# Patient Record
Sex: MALE | Race: WHITE | NOT HISPANIC OR LATINO | Employment: STUDENT | URBAN - METROPOLITAN AREA
[De-identification: names, ages, dates, MRNs, and addresses within clinical notes are randomized per-mention and may not be internally consistent; named-entity substitution may affect disease eponyms.]

---

## 2023-04-10 ENCOUNTER — OFFICE VISIT (OUTPATIENT)
Dept: PEDIATRICS | Facility: CLINIC | Age: 23
End: 2023-04-10
Payer: COMMERCIAL

## 2023-04-10 VITALS — TEMPERATURE: 98.1 F | BODY MASS INDEX: 22.88 KG/M2 | HEART RATE: 88 BPM | WEIGHT: 157.2 LBS | OXYGEN SATURATION: 98 %

## 2023-04-10 VITALS
HEART RATE: 69 BPM | SYSTOLIC BLOOD PRESSURE: 118 MMHG | HEIGHT: 70 IN | WEIGHT: 138.25 LBS | BODY MASS INDEX: 19.79 KG/M2 | DIASTOLIC BLOOD PRESSURE: 72 MMHG

## 2023-04-10 DIAGNOSIS — R10.9 FLANK PAIN, ACUTE: ICD-10-CM

## 2023-04-10 DIAGNOSIS — R31.0 GROSS HEMATURIA: Primary | ICD-10-CM

## 2023-04-10 PROBLEM — F41.9 ANXIETY: Status: ACTIVE | Noted: 2023-04-10

## 2023-04-10 PROBLEM — B27.90 INFECTIOUS MONONUCLEOSIS: Status: ACTIVE | Noted: 2019-02-25

## 2023-04-10 PROBLEM — F12.10 CANNABIS ABUSE: Status: ACTIVE | Noted: 2023-04-10

## 2023-04-10 PROBLEM — H52.10 MYOPIA: Status: ACTIVE | Noted: 2023-04-10

## 2023-04-10 PROBLEM — H52.11 MYOPIA OF RIGHT EYE: Status: ACTIVE | Noted: 2019-08-14

## 2023-04-10 PROBLEM — H57.02 ANISOCORIA: Status: ACTIVE | Noted: 2023-04-10

## 2023-04-10 PROBLEM — H47.329 OPTIC NERVE DRUSEN: Status: ACTIVE | Noted: 2023-04-10

## 2023-04-10 PROBLEM — F90.2 ADHD (ATTENTION DEFICIT HYPERACTIVITY DISORDER), COMBINED TYPE: Status: ACTIVE | Noted: 2023-04-10

## 2023-04-10 PROBLEM — H47.099 DISORDER OF OPTIC NERVE: Status: ACTIVE | Noted: 2018-06-14

## 2023-04-10 PROBLEM — B95.8 STAPH SKIN INFECTION: Status: ACTIVE | Noted: 2023-04-10

## 2023-04-10 PROBLEM — F15.20: Status: ACTIVE | Noted: 2023-04-10

## 2023-04-10 PROBLEM — F12.20 CANNABIS USE DISORDER, SEVERE, DEPENDENCE (MULTI): Status: ACTIVE | Noted: 2023-04-10

## 2023-04-10 PROBLEM — R41.840 ATTENTION AND CONCENTRATION DEFICIT: Status: ACTIVE | Noted: 2020-06-26

## 2023-04-10 PROBLEM — L08.9 STAPH SKIN INFECTION: Status: ACTIVE | Noted: 2023-04-10

## 2023-04-10 PROBLEM — H53.8 BLURRED VISION: Status: ACTIVE | Noted: 2023-04-10

## 2023-04-10 PROBLEM — F10.20 ALCOHOL USE DISORDER, MODERATE, DEPENDENCE (MULTI): Status: ACTIVE | Noted: 2023-04-10

## 2023-04-10 PROBLEM — G43.109 CLASSIC MIGRAINE WITH AURA: Status: ACTIVE | Noted: 2023-04-10

## 2023-04-10 LAB
POC APPEARANCE, URINE: ABNORMAL
POC BILIRUBIN, URINE: NEGATIVE
POC BLOOD, URINE: ABNORMAL
POC COLOR, URINE: YELLOW
POC GLUCOSE, URINE: NEGATIVE MG/DL
POC KETONES, URINE: NEGATIVE MG/DL
POC LEUKOCYTES, URINE: ABNORMAL
POC NITRITE,URINE: NEGATIVE
POC PH, URINE: 5 PH
POC PROTEIN, URINE: ABNORMAL MG/DL
POC SPECIFIC GRAVITY, URINE: >=1.03
POC UROBILINOGEN, URINE: 0.2 EU/DL

## 2023-04-10 PROCEDURE — 81002 URINALYSIS NONAUTO W/O SCOPE: CPT | Performed by: PEDIATRICS

## 2023-04-10 PROCEDURE — 99215 OFFICE O/P EST HI 40 MIN: CPT | Performed by: PEDIATRICS

## 2023-04-10 RX ORDER — ESCITALOPRAM OXALATE 10 MG/1
TABLET ORAL
COMMUNITY
End: 2023-10-06 | Stop reason: ALTCHOICE

## 2023-04-10 RX ORDER — ESCITALOPRAM OXALATE 20 MG/1
1 TABLET ORAL DAILY
COMMUNITY
Start: 2022-09-14 | End: 2023-10-06 | Stop reason: ALTCHOICE

## 2023-04-10 RX ORDER — DEXTROAMPHETAMINE SACCHARATE, AMPHETAMINE ASPARTATE MONOHYDRATE, DEXTROAMPHETAMINE SULFATE AND AMPHETAMINE SULFATE 5; 5; 5; 5 MG/1; MG/1; MG/1; MG/1
1 CAPSULE, EXTENDED RELEASE ORAL DAILY
COMMUNITY
End: 2023-10-06 | Stop reason: ALTCHOICE

## 2023-04-10 RX ORDER — FLUOXETINE 20 MG/1
TABLET ORAL
COMMUNITY
Start: 2023-01-19 | End: 2023-10-06 | Stop reason: SINTOL

## 2023-04-10 RX ORDER — FLUOXETINE HYDROCHLORIDE 20 MG/1
1 CAPSULE ORAL DAILY
COMMUNITY
Start: 2023-01-26 | End: 2023-10-11 | Stop reason: ALTCHOICE

## 2023-04-10 RX ORDER — HYDROXYZINE HYDROCHLORIDE 25 MG/1
TABLET, FILM COATED ORAL
COMMUNITY
Start: 2023-02-07 | End: 2023-10-06 | Stop reason: SINTOL

## 2023-04-10 RX ORDER — FLUOXETINE HYDROCHLORIDE 40 MG/1
CAPSULE ORAL
COMMUNITY
Start: 2023-04-07 | End: 2023-10-11 | Stop reason: ALTCHOICE

## 2023-04-10 RX ORDER — DEXTROAMPHETAMINE SACCHARATE, AMPHETAMINE ASPARTATE MONOHYDRATE, DEXTROAMPHETAMINE SULFATE AND AMPHETAMINE SULFATE 7.5; 7.5; 7.5; 7.5 MG/1; MG/1; MG/1; MG/1
CAPSULE, EXTENDED RELEASE ORAL
COMMUNITY
End: 2023-10-06 | Stop reason: ALTCHOICE

## 2023-04-10 RX ORDER — MUPIROCIN 20 MG/G
OINTMENT TOPICAL 3 TIMES DAILY
COMMUNITY
Start: 2018-06-29 | End: 2023-10-06 | Stop reason: SINTOL

## 2023-04-10 RX ORDER — DEXTROAMPHETAMINE SACCHARATE, AMPHETAMINE ASPARTATE MONOHYDRATE, DEXTROAMPHETAMINE SULFATE AND AMPHETAMINE SULFATE 6.25; 6.25; 6.25; 6.25 MG/1; MG/1; MG/1; MG/1
CAPSULE, EXTENDED RELEASE ORAL
COMMUNITY
End: 2023-10-06 | Stop reason: ALTCHOICE

## 2023-04-10 NOTE — PROGRESS NOTES
Subjective   History was provided by the mother and patient.  Hima Mesa is a 22 y.o. male who presents for evaluation of URI  Symptoms include nasal blockage and productive cough, somewhat improved in alst few days but also R flank/frontal pain x this AM, signif per pt (7/10 now; just vomited in room)  - pee looked NL this AM but darker just now here  - no hx kidney stones in pt - MGM w/ hx stones   Tmax afebrile  Symptoms DO NOT include:  headache, sinus and nasal congestion, and sore throat   Onset of symptoms was 7 days ago  Associated abdominal symptoms:  vomiting no diar  He is drinking plenty of fluids.   Energy level NL:  No  Treatment to date: none    Exposure to COVID No  Exposure to AGE sx No    Objective   Pulse 88   Temp 36.7 °C (98.1 °F)   Wt 71.3 kg (157 lb 3.2 oz)   SpO2 98%   BMI 22.88 kg/m²   General: alert, active, in no acute distress  Eyes:  scleral injection No  Throat: moist mucous membranes without erythema, exudates or petechiae  Neck: supple, no lymphadenopathy  Lungs: good aeration throughout all lung fields, no retractions, no nasal flaring, and clear breath sounds bilaterally  Heart: regular rate and rhythm, normal S1 and S2, no murmur  Abd:  soft or tender to palpation mostly on R and R flank, also suprapubic    Assessment/Plan   22 y.o. male w/ viral upper respiratory illness and likely renal stone  Suggested symptomatic OTC remedies. :  gave 600mg ibupr and 8mg ondans ODT now - mom just called (30m later) and requested opiate pain control.  Since pt needs labs and likely imaging should head to [facility].   Spoke w/ ED attg there.

## 2023-04-24 ENCOUNTER — TELEPHONE (OUTPATIENT)
Dept: PEDIATRICS | Facility: CLINIC | Age: 23
End: 2023-04-24
Payer: COMMERCIAL

## 2023-04-24 NOTE — TELEPHONE ENCOUNTER
- spoke w/ mom re:  ?cont substance use - wanted to know how to proceed - pointed her to local centers, can call intake and hopefully he might agree w/ the need to go   - wanted BF and states will also call her for advice when she's back

## 2023-06-01 ENCOUNTER — TELEPHONE (OUTPATIENT)
Dept: PEDIATRICS | Facility: CLINIC | Age: 23
End: 2023-06-01
Payer: COMMERCIAL

## 2023-06-01 PROBLEM — N20.0 KIDNEY STONE ON RIGHT SIDE: Status: ACTIVE | Noted: 2023-06-01

## 2023-06-01 NOTE — TELEPHONE ENCOUNTER
Mom called to give me an update and seek a referral. Issues over the past year. Has been seeing [facility].  1/2022  IOP - left after 3 d. [facility] - diversion program.  Completed program but no changes made.  Working through [employer]. No current job.      Also discussed kidney stone.

## 2023-08-29 PROBLEM — N21.1 CALCULUS IN URETHRA: Status: ACTIVE | Noted: 2023-08-29

## 2023-08-29 RX ORDER — NAPROXEN 500 MG/1
1 TABLET ORAL 2 TIMES DAILY
COMMUNITY
Start: 2023-04-10 | End: 2023-10-06 | Stop reason: SINTOL

## 2023-09-13 ENCOUNTER — APPOINTMENT (OUTPATIENT)
Dept: PEDIATRICS | Facility: CLINIC | Age: 23
End: 2023-09-13
Payer: COMMERCIAL

## 2023-10-06 ENCOUNTER — TELEMEDICINE (OUTPATIENT)
Dept: BEHAVIORAL HEALTH | Facility: CLINIC | Age: 23
End: 2023-10-06
Payer: COMMERCIAL

## 2023-10-06 DIAGNOSIS — F41.9 ANXIETY: ICD-10-CM

## 2023-10-06 DIAGNOSIS — F10.20 ALCOHOL USE DISORDER, MODERATE, DEPENDENCE (MULTI): ICD-10-CM

## 2023-10-06 DIAGNOSIS — F90.2 ADHD (ATTENTION DEFICIT HYPERACTIVITY DISORDER), COMBINED TYPE: ICD-10-CM

## 2023-10-06 DIAGNOSIS — F15.20: ICD-10-CM

## 2023-10-06 DIAGNOSIS — F12.20 CANNABIS USE DISORDER, SEVERE, DEPENDENCE (MULTI): ICD-10-CM

## 2023-10-06 PROCEDURE — 99214 OFFICE O/P EST MOD 30 MIN: CPT | Performed by: PSYCHIATRY & NEUROLOGY

## 2023-10-06 RX ORDER — MIRTAZAPINE 15 MG/1
15 TABLET, FILM COATED ORAL NIGHTLY
Qty: 30 TABLET | Refills: 2 | Status: SHIPPED | OUTPATIENT
Start: 2023-10-06 | End: 2023-11-24 | Stop reason: SDUPTHER

## 2023-10-06 ASSESSMENT — ENCOUNTER SYMPTOMS
CONFUSION: 0
AGITATION: 0
HALLUCINATIONS: 0

## 2023-10-10 RX ORDER — MELATONIN 5 MG
5 CAPSULE ORAL
COMMUNITY
End: 2023-10-11 | Stop reason: ALTCHOICE

## 2023-10-10 RX ORDER — CEPHALEXIN 500 MG/1
500 CAPSULE ORAL 3 TIMES DAILY
COMMUNITY
Start: 2023-07-24 | End: 2023-10-11 | Stop reason: ALTCHOICE

## 2023-10-10 RX ORDER — TAMSULOSIN HYDROCHLORIDE 0.4 MG/1
CAPSULE ORAL
COMMUNITY
End: 2023-10-11 | Stop reason: ALTCHOICE

## 2023-10-10 RX ORDER — NALTREXONE HYDROCHLORIDE 50 MG/1
TABLET, FILM COATED ORAL
COMMUNITY
Start: 2023-07-07 | End: 2023-10-11 | Stop reason: ALTCHOICE

## 2023-10-10 RX ORDER — MIRTAZAPINE 15 MG/1
15 TABLET, FILM COATED ORAL
COMMUNITY
End: 2023-10-11 | Stop reason: SDUPTHER

## 2023-10-10 RX ORDER — NALOXONE HYDROCHLORIDE 4 MG/.1ML
4 SPRAY NASAL
COMMUNITY
End: 2023-10-11 | Stop reason: ALTCHOICE

## 2023-10-10 RX ORDER — ATOMOXETINE 25 MG/1
CAPSULE ORAL
COMMUNITY
End: 2023-10-11 | Stop reason: ALTCHOICE

## 2023-10-11 ENCOUNTER — OFFICE VISIT (OUTPATIENT)
Dept: PEDIATRICS | Facility: CLINIC | Age: 23
End: 2023-10-11
Payer: COMMERCIAL

## 2023-10-11 VITALS
BODY MASS INDEX: 25.41 KG/M2 | WEIGHT: 177.5 LBS | SYSTOLIC BLOOD PRESSURE: 133 MMHG | HEART RATE: 90 BPM | HEIGHT: 70 IN | DIASTOLIC BLOOD PRESSURE: 89 MMHG

## 2023-10-11 DIAGNOSIS — Z23 FLU VACCINE NEED: ICD-10-CM

## 2023-10-11 DIAGNOSIS — Z13.9 SCREENING PROCEDURE: ICD-10-CM

## 2023-10-11 DIAGNOSIS — Z00.00 WELLNESS EXAMINATION: Primary | ICD-10-CM

## 2023-10-11 DIAGNOSIS — Z23 NEED FOR VACCINATION: ICD-10-CM

## 2023-10-11 DIAGNOSIS — Z13.220 LIPID SCREENING: ICD-10-CM

## 2023-10-11 PROBLEM — B27.90 INFECTIOUS MONONUCLEOSIS: Status: RESOLVED | Noted: 2019-02-25 | Resolved: 2023-10-11

## 2023-10-11 PROBLEM — G43.109 CLASSIC MIGRAINE WITH AURA: Status: RESOLVED | Noted: 2023-04-10 | Resolved: 2023-10-11

## 2023-10-11 PROBLEM — L08.9 STAPH SKIN INFECTION: Status: RESOLVED | Noted: 2023-04-10 | Resolved: 2023-10-11

## 2023-10-11 PROBLEM — B95.8 STAPH SKIN INFECTION: Status: RESOLVED | Noted: 2023-04-10 | Resolved: 2023-10-11

## 2023-10-11 PROBLEM — R41.840 ATTENTION AND CONCENTRATION DEFICIT: Status: RESOLVED | Noted: 2020-06-26 | Resolved: 2023-10-11

## 2023-10-11 PROCEDURE — 1036F TOBACCO NON-USER: CPT | Performed by: PEDIATRICS

## 2023-10-11 PROCEDURE — 90686 IIV4 VACC NO PRSV 0.5 ML IM: CPT | Performed by: PEDIATRICS

## 2023-10-11 PROCEDURE — 90715 TDAP VACCINE 7 YRS/> IM: CPT | Performed by: PEDIATRICS

## 2023-10-11 PROCEDURE — 99395 PREV VISIT EST AGE 18-39: CPT | Performed by: PEDIATRICS

## 2023-10-11 PROCEDURE — 87800 DETECT AGNT MULT DNA DIREC: CPT

## 2023-10-11 PROCEDURE — 3008F BODY MASS INDEX DOCD: CPT | Performed by: PEDIATRICS

## 2023-10-11 PROCEDURE — 99214 OFFICE O/P EST MOD 30 MIN: CPT | Performed by: PEDIATRICS

## 2023-10-11 PROCEDURE — 90472 IMMUNIZATION ADMIN EACH ADD: CPT | Performed by: PEDIATRICS

## 2023-10-11 PROCEDURE — 96127 BRIEF EMOTIONAL/BEHAV ASSMT: CPT | Performed by: PEDIATRICS

## 2023-10-11 PROCEDURE — 90471 IMMUNIZATION ADMIN: CPT | Performed by: PEDIATRICS

## 2023-10-11 NOTE — PROGRESS NOTES
Review of current and recent hx:   Living in [facility] in [facility] - since August.  IOP but also have houses so you can stay there as long as you want. Finished IOP.  In third week of aftercare. 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  Plans to stay there until he gets off probation 1/2025.  After that wants to go back to school and get degree.  He needs to pay rent and provide food where he is so is looking for a job.  He gets private and group therapy multiple days per week.   Was in [facility] in [place] for a month - in patient facility.  Court mandated due to DUI (alcohol). [facility] was nice compared to [facility] (was there for 2 weeks - [facility]).    Spring 2022 last semester he was at [employer] [place]. Covid restricted his [place] and was using weed, alcohol, adderall, cocaine.  Came back to town.  At one point got kidney stones and got percocet and oxycodone which was cut at some point with xanax.      [facility] - remiron, meetings helped.  Weed was the most difficult to get off.      Frustrated and angry that he was put in [facility] after 2nd dui.    110 days sober - feels stuck.     A/P in structured environment for drug dependence.  Doing well with program.  We acknowledged the difficulties of the situation but the need for staying with it.  Follow up in 6 months.

## 2023-10-11 NOTE — PROGRESS NOTES
"Subjective   History was provided by Will ( I have been in contact with mom over the past few months).  Hima Mesa is a 22 y.o. male who is here for this well visit.      Current Issues:  Current concerns: see HPI  Vision or hearing concerns? no  Dental care up to date? Yes- brushes teeth 2 times/day , regular dental visits , does floss teeth   No significant recent physical health issues - hasn't had a kidney stone for a while  Specialist visits - Psychiatry    Review of Nutrition, Elimination, and Sleep:  Current diet:  3 meals/day , well balanced diet , normal portions , <8oz. sugar containing beverages daily , appropriate dairy intake , diet includes fruits and vegetables and protein.  Elimination: normal bowel movement frequency, normal consistency   Sleep: has structured bedtime routine , sleeps through the night , no trouble getting up    School and Social Screening:  School: not currently  Work: not currently  Activities: working out  Supportive social network. Current relationship - none.    Sports Participation Screening:  Gets regular exercise.    Screening Questions:  Other: normal mood, satisfied with body weight  Risk factors for dyslipidemia: no  Risk factors for sexually-transmitted infections:   Sexually active: yes - but not currently    Using condoms: Yes  Substance use:  Smoking - No  Vaping - No  Drinking - No  Drugs - No  Genitourinary:  +testicular exams    Objective   /89   Pulse 90   Ht 1.765 m (5' 9.5\")   Wt 80.5 kg (177 lb 8 oz)   BMI 25.84 kg/m²   Growth parameters are noted and are appropriate for age.    Physical Exam  Vitals reviewed.   Constitutional:       Appearance: Normal appearance.   HENT:      Head: Normocephalic.      Right Ear: Tympanic membrane and ear canal normal.      Left Ear: Tympanic membrane and ear canal normal.      Nose: Nose normal.      Mouth/Throat:      Mouth: Mucous membranes are moist.   Eyes:      Extraocular Movements: Extraocular movements " intact.      Conjunctiva/sclera: Conjunctivae normal.   Cardiovascular:      Rate and Rhythm: Normal rate and regular rhythm.      Heart sounds: Normal heart sounds.   Pulmonary:      Effort: Pulmonary effort is normal.      Breath sounds: Normal breath sounds.   Abdominal:      General: Abdomen is flat.      Palpations: Abdomen is soft. There is no mass.   Genitourinary:     Penis: Normal.       Testes: Normal.   Musculoskeletal:         General: Normal range of motion.      Cervical back: Normal and normal range of motion.      Thoracic back: Normal. No scoliosis.      Lumbar back: Normal. No scoliosis.      Right hip: Normal.      Left hip: Normal.      Right knee: Normal.      Left knee: Normal.      Right ankle: Normal.      Left ankle: Normal.      Right foot: Normal.      Left foot: Normal.   Skin:     General: Skin is warm.      Findings: No acne or rash.   Neurological:      General: No focal deficit present.      Mental Status: He is alert and oriented to person, place, and time.   Psychiatric:         Mood and Affect: Mood normal.         Behavior: Behavior normal.         Assessment/Plan   Hima was seen today for well child.  Diagnoses and all orders for this visit:  Wellness examination (Primary)  Lipid screening  -     Lipid Panel; Future  Screening procedure  -     C. Trachomatis / N. Gonorrhoeae, Amplified Detection  Need for vaccination  -     Tdap vaccine, age 7 years and older  Flu vaccine need  -     Flu vaccine (IIV4) age 6 months and greater, preservative free  Other orders  -     1 Year Follow Up In Pediatrics; Future  -     6 Month Follow Up In Pediatrics; Future    Well young adult.  - Anticipatory guidance discussed.   - Injury prevention: wearing seatbelt , understanding sun protection , understanding conflict resolution/violence prevention,  reviewed driving safety    -Risk Taking: cardiac risk factors reviewed , alcohol, drug and tobacco use reviewed , reviewed internet safety      -  Growth and weight gain appropriate. The patient was counseled regarding nutrition and physical activity.  - Development: appropriate for age  - Immunizations today: per orders. All vaccines given at today’s visit were reviewed with the family. Risks/benefits/side effects discussed and VIS sheet provided. All questions answered. Given with consent   - Follow up in 1 year for next well child exam or sooner with concerns.

## 2023-10-12 LAB
C TRACH RRNA SPEC QL NAA+PROBE: NEGATIVE
N GONORRHOEA DNA SPEC QL PROBE+SIG AMP: NEGATIVE

## 2023-11-24 ENCOUNTER — TELEMEDICINE (OUTPATIENT)
Dept: BEHAVIORAL HEALTH | Facility: CLINIC | Age: 23
End: 2023-11-24
Payer: COMMERCIAL

## 2023-11-24 DIAGNOSIS — F10.20 ALCOHOL USE DISORDER, MODERATE, DEPENDENCE (MULTI): ICD-10-CM

## 2023-11-24 DIAGNOSIS — F15.20: ICD-10-CM

## 2023-11-24 DIAGNOSIS — F41.9 ANXIETY: ICD-10-CM

## 2023-11-24 DIAGNOSIS — F12.20 CANNABIS USE DISORDER, SEVERE, DEPENDENCE (MULTI): ICD-10-CM

## 2023-11-24 DIAGNOSIS — F90.2 ADHD (ATTENTION DEFICIT HYPERACTIVITY DISORDER), COMBINED TYPE: ICD-10-CM

## 2023-11-24 PROCEDURE — 90833 PSYTX W PT W E/M 30 MIN: CPT | Performed by: PSYCHIATRY & NEUROLOGY

## 2023-11-24 PROCEDURE — 99214 OFFICE O/P EST MOD 30 MIN: CPT | Performed by: PSYCHIATRY & NEUROLOGY

## 2023-11-24 RX ORDER — MIRTAZAPINE 15 MG/1
15 TABLET, FILM COATED ORAL NIGHTLY
Qty: 90 TABLET | Refills: 0 | Status: SHIPPED | OUTPATIENT
Start: 2023-11-24 | End: 2024-01-12 | Stop reason: ALTCHOICE

## 2023-11-24 NOTE — PROGRESS NOTES
"Subjective    All Individuals Present: Patient and Provider (Encounter Provider)     ID: Hima Mesa is a 23 y.o. male with ADHD, anxiety, EtOH Use Disorder, Amphetamine Use Disorder     Interval History/HPI/PFSH:  Pt states he has been \"good\" since last visit, home for [employer], which has been \"a little weird\" as he used to \"do drugs\" when he was home. He has been working to build new routines other than use substances. He has a \"good Thanksgiving,\" playing [employer] boxing with his cousins.    Pt started smoking cigarettes as an alternative to used other substances, stating that other people in rehab have been smoking as well. He estimates smoking about 8 cigarettes per day.    Pt has been working at [employer], where he runs into some \"[employer].\" He continues to live at sober house and gets weekly UDS.    On ROS, he enjoyed Thanksgiving. He is future-oriented to going back to sober house and [place]. Mood \"pretty good,\" denies feeling down or depressed. Denies overwhelming anxiety. Sleeps 8-10 hours in 24 hours, though it is not all at bedtime. No SI/HI/AVH.     Medication side effects: None Reported     Review of Systems  See HPI.    Objective   There were no vitals taken for this visit.  Wt Readings from Last 4 Encounters:   10/11/23 80.5 kg (177 lb 8 oz)   04/10/23 71.3 kg (157 lb 3.2 oz)   06/26/20 62.7 kg (138 lb 4 oz) (28 %, Z= -0.57)*   09/14/22 69.5 kg (153 lb 3.2 oz)     * Growth percentiles are based on CDC (Boys, 2-20 Years) data.       Mental Status Exam  General Appearance: Well groomed, appropriate eye contact.  Attitude/Behavior: Cooperative, conversant, engaged, and with good eye contact.  Motor: No psychomotor agitation or retardation, no tremor or other abnormal movements.  Speech: Normal rate, volume, prosody  Gait/Station: Within normal limits  Mood: \"good\".  Affect: Euthymic, full-range  Thought Process: Linear, goal directed  Thought Associations: No loosening of " associations  Thought Content: normal  Sensorium: Alert and oriented to person, place, time and situation  Insight:  intact  Judgment: Intact  Cognition: Cognitively intact to conversational testing with respect to attention, orientation, fund of knowledge, recent and remote memory, and language.  Testing: N/A.    Laboratory/Imaging/Diagnostic Tests   N/A    Assessment/Plan   Overall Formulation and Differential Diagnosis:  Hima Mesa is a 23 y.o. male who meets criteria for ADHD, anxiety, EtOH Use Disorder, and Amphetamine Use Disorder  Interval Assessment:  Pt doing well, sober since last visit, continuing to live at sober house and working at [employer].  Risk Assessment:  Imminent Risk of Suicide or Serious Self-Injury: Low Risk -- Risk factors include: Age, Gender, and History of alcohol or other substance use disorder  Protective factors include:Denies current suicidal ideation, Denies history of suicide attempts , Future-oriented talk , Willingness to seek help and support , Skills in problem solving, conflict resolution, and nonviolent handling of disputes, Cultural and Gnosticist beliefs that discourage suicide and support self-preservation , Access to a variety of clinical interventions , Receiving and engaged in care for mental, physical, and substance use disorders , History of adhering to treatment recommendations and/or prescribed medication regimen , Support through ongoing medical and mental healthcare relationships , Current/history of good response to treatment/meds , and Interpersonal relationships and supports, e.g., family, friends, peers, community   Imminent Risk of Violence or Homicide: Low Risk - Risk factors include: Current/history of alcohol or other substance abuse, especially PCP or stimulants  and Gender. Protective factors include: Lack of known history of harm to others , Lack of known history of violent ideation , Lack of known access to firearms , Sense of community,  availability/access to resources and support , Sense of optimism, hope , Interpersonal competence , Affect regulation , Sense of self-efficacy, internal locus of control , and Positive, pro-social family/peer network   Treatment Plan:  There are no recently modified care plans to display for this patient.    -continue remeron 15mg PO at bedtime  -RTC 2-3 months.    Attestation Statements   Number of minutes spent performing psychotherapy: 17min and Psychotherapy Modality: Cognitive Behavioral Therapy

## 2024-01-12 ENCOUNTER — TELEMEDICINE (OUTPATIENT)
Dept: BEHAVIORAL HEALTH | Facility: CLINIC | Age: 24
End: 2024-01-12
Payer: COMMERCIAL

## 2024-01-12 DIAGNOSIS — F10.20 ALCOHOL USE DISORDER, MODERATE, DEPENDENCE (MULTI): ICD-10-CM

## 2024-01-12 DIAGNOSIS — F90.2 ADHD (ATTENTION DEFICIT HYPERACTIVITY DISORDER), COMBINED TYPE: ICD-10-CM

## 2024-01-12 DIAGNOSIS — F41.9 ANXIETY: ICD-10-CM

## 2024-01-12 DIAGNOSIS — F15.20: ICD-10-CM

## 2024-01-12 DIAGNOSIS — F12.20 CANNABIS USE DISORDER, SEVERE, DEPENDENCE (MULTI): ICD-10-CM

## 2024-01-12 PROCEDURE — 90833 PSYTX W PT W E/M 30 MIN: CPT | Performed by: PSYCHIATRY & NEUROLOGY

## 2024-01-12 PROCEDURE — 99214 OFFICE O/P EST MOD 30 MIN: CPT | Performed by: PSYCHIATRY & NEUROLOGY

## 2024-01-12 RX ORDER — TRAZODONE HYDROCHLORIDE 50 MG/1
TABLET ORAL
Qty: 60 TABLET | Refills: 2 | Status: SHIPPED | OUTPATIENT
Start: 2024-01-12 | End: 2024-03-08 | Stop reason: SDUPTHER

## 2024-01-12 NOTE — PROGRESS NOTES
"Subjective    All Individuals Present: Patient and Provider (Encounter Provider)     ID: Hima Mesa is a 23 y.o. male with ADHD, anxiety, depression, and substance use     Interval History/HPI/PFSH:  Pt states he has been \"okay.\" He has gained some weight, which he thinks may be due to medication. He now weighs 200 lbs, previously 140 lbs in 6/2023.    Pt states he likes remeron, \"it does the job\" and helps him get to sleep. He does not like how it is making him gain weight.    On ROS, pt states that his mood has been \"good.\" He has been sleeping well. Appetite good, though his weight has increased by 60 lbs since 6/2023. He denies feeling overly worried or anxious. Sometimes, he can have difficulty getting out of bed in the AM, but overall \"works fine\" and has good energy levels overall.     Medication side effects:  increased appetite and weight gain with remeron     Review of Systems  See HPI.    Objective   There were no vitals taken for this visit.  Wt Readings from Last 4 Encounters:   10/11/23 80.5 kg (177 lb 8 oz)   04/10/23 71.3 kg (157 lb 3.2 oz)   06/26/20 62.7 kg (138 lb 4 oz) (28 %, Z= -0.57)*   09/14/22 69.5 kg (153 lb 3.2 oz)     * Growth percentiles are based on [facility] (Boys, 2-20 Years) data.       Mental Status Exam  General Appearance: Well groomed, appropriate eye contact. and slight mustache, wearing Guardians hoodie.  Attitude/Behavior: Cooperative, conversant, engaged, and with good eye contact.  Motor: No psychomotor agitation or retardation, no tremor or other abnormal movements.  Speech: Normal rate, volume, prosody  Gait/Station: Within normal limits  Mood: \"pretty good\".  Affect: Euthymic, full-range  Thought Process: Linear, goal directed  Thought Associations: No loosening of associations  Thought Content: normal  Sensorium: Alert and oriented to person, place, time and situation  Insight:  intact  Judgment: Intact  Cognition: Cognitively intact to conversational testing with respect " to attention, orientation, fund of knowledge, recent and remote memory, and language.  Testing: N/A.    Laboratory/Imaging/Diagnostic Tests   N/A    Assessment/Plan   Overall Formulation and Differential Diagnosis:  Hima Mesa is a 23 y.o. male who meets criteria for ADHD, depression, anxiety and substance use  Interval Assessment:  Pt doing well, though he has noticed weight gain (~60 lbs over 6 months) and is interested in alternative to Remeron.  Risk Assessment:  Imminent Risk of Suicide or Serious Self-Injury: Low Risk -- Risk factors include: Age, Gender, and History of alcohol or other substance use disorder  Protective factors include:Denies current suicidal ideation, Denies history of suicide attempts , Future-oriented talk , Willingness to seek help and support , Skills in problem solving, conflict resolution, and nonviolent handling of disputes, Cultural and Nondenominational beliefs that discourage suicide and support self-preservation , Access to a variety of clinical interventions , Receiving and engaged in care for mental, physical, and substance use disorders , History of adhering to treatment recommendations and/or prescribed medication regimen , Support through ongoing medical and mental healthcare relationships , Current/history of good response to treatment/meds , and Interpersonal relationships and supports, e.g., family, friends, peers, community   Imminent Risk of Violence or Homicide: Low Risk - Risk factors include: Gender. Protective factors include: Lack of known history of harm to others , Lack of known history of violent ideation , Lack of known access to firearms , Sense of community, availability/access to resources and support , Sense of optimism, hope , Interpersonal competence , Affect regulation , Sense of self-efficacy, internal locus of control , and Positive, pro-social family/peer network   Treatment Plan:  There are no recently modified care plans to display for this  patient.    -discontinue Remeron 15mg PO at bedtime.  -after discussion of I/r/b/a, start trazodone 50-100mg PO at bedtime.  -RTC 1-2 months.    Attestation Statements   Number of minutes spent performing psychotherapy: 17min and Psychotherapy Modality: Cognitive Behavioral Therapy

## 2024-01-29 ENCOUNTER — APPOINTMENT (OUTPATIENT)
Dept: RADIOLOGY | Facility: HOSPITAL | Age: 24
End: 2024-01-29
Payer: COMMERCIAL

## 2024-01-29 ENCOUNTER — HOSPITAL ENCOUNTER (OUTPATIENT)
Facility: HOSPITAL | Age: 24
Setting detail: OBSERVATION
LOS: 1 days | Discharge: HOME | End: 2024-01-29
Attending: STUDENT IN AN ORGANIZED HEALTH CARE EDUCATION/TRAINING PROGRAM | Admitting: PHYSICIAN ASSISTANT
Payer: COMMERCIAL

## 2024-01-29 ENCOUNTER — ANESTHESIA (OUTPATIENT)
Dept: OPERATING ROOM | Facility: HOSPITAL | Age: 24
End: 2024-01-29
Payer: COMMERCIAL

## 2024-01-29 ENCOUNTER — ANESTHESIA EVENT (OUTPATIENT)
Dept: OPERATING ROOM | Facility: HOSPITAL | Age: 24
End: 2024-01-29
Payer: COMMERCIAL

## 2024-01-29 VITALS
HEIGHT: 70 IN | OXYGEN SATURATION: 98 % | RESPIRATION RATE: 18 BRPM | WEIGHT: 201.94 LBS | BODY MASS INDEX: 28.91 KG/M2 | HEART RATE: 69 BPM | SYSTOLIC BLOOD PRESSURE: 131 MMHG | TEMPERATURE: 97.2 F | DIASTOLIC BLOOD PRESSURE: 80 MMHG

## 2024-01-29 DIAGNOSIS — N20.1 URETERAL CALCULUS: ICD-10-CM

## 2024-01-29 DIAGNOSIS — N20.2 CALCULUS OF KIDNEY WITH CALCULUS OF URETER: Primary | ICD-10-CM

## 2024-01-29 DIAGNOSIS — N20.1 RIGHT URETERAL STONE: ICD-10-CM

## 2024-01-29 DIAGNOSIS — R10.9 FLANK PAIN: ICD-10-CM

## 2024-01-29 PROBLEM — N20.0 KIDNEY STONE ON LEFT SIDE: Status: ACTIVE | Noted: 2024-01-29

## 2024-01-29 PROBLEM — N20.0 KIDNEY STONE ON LEFT SIDE: Status: RESOLVED | Noted: 2024-01-29 | Resolved: 2024-01-29

## 2024-01-29 LAB
ANION GAP SERPL CALC-SCNC: 15 MMOL/L (ref 10–20)
APPEARANCE UR: CLEAR
BASOPHILS # BLD AUTO: 0.02 X10*3/UL (ref 0–0.1)
BASOPHILS NFR BLD AUTO: 0.1 %
BILIRUB UR STRIP.AUTO-MCNC: NEGATIVE MG/DL
BUN SERPL-MCNC: 22 MG/DL (ref 6–23)
CALCIUM SERPL-MCNC: 9.4 MG/DL (ref 8.6–10.3)
CHLORIDE SERPL-SCNC: 102 MMOL/L (ref 98–107)
CO2 SERPL-SCNC: 23 MMOL/L (ref 21–32)
COLOR UR: YELLOW
CREAT SERPL-MCNC: 1.27 MG/DL (ref 0.5–1.3)
EGFRCR SERPLBLD CKD-EPI 2021: 82 ML/MIN/1.73M*2
EOSINOPHIL # BLD AUTO: 0.12 X10*3/UL (ref 0–0.7)
EOSINOPHIL NFR BLD AUTO: 0.8 %
ERYTHROCYTE [DISTWIDTH] IN BLOOD BY AUTOMATED COUNT: 11.7 % (ref 11.5–14.5)
GLUCOSE SERPL-MCNC: 104 MG/DL (ref 74–99)
GLUCOSE UR STRIP.AUTO-MCNC: NEGATIVE MG/DL
HCT VFR BLD AUTO: 40.3 % (ref 41–52)
HGB BLD-MCNC: 14.8 G/DL (ref 13.5–17.5)
HOLD SPECIMEN: NORMAL
IMM GRANULOCYTES # BLD AUTO: 0.04 X10*3/UL (ref 0–0.7)
IMM GRANULOCYTES NFR BLD AUTO: 0.3 % (ref 0–0.9)
KETONES UR STRIP.AUTO-MCNC: NEGATIVE MG/DL
LEUKOCYTE ESTERASE UR QL STRIP.AUTO: NEGATIVE
LYMPHOCYTES # BLD AUTO: 1.72 X10*3/UL (ref 1.2–4.8)
LYMPHOCYTES NFR BLD AUTO: 11.5 %
MCH RBC QN AUTO: 32 PG (ref 26–34)
MCHC RBC AUTO-ENTMCNC: 36.7 G/DL (ref 32–36)
MCV RBC AUTO: 87 FL (ref 80–100)
MONOCYTES # BLD AUTO: 1.41 X10*3/UL (ref 0.1–1)
MONOCYTES NFR BLD AUTO: 9.4 %
NEUTROPHILS # BLD AUTO: 11.64 X10*3/UL (ref 1.2–7.7)
NEUTROPHILS NFR BLD AUTO: 77.9 %
NITRITE UR QL STRIP.AUTO: NEGATIVE
NRBC BLD-RTO: ABNORMAL /100{WBCS}
PH UR STRIP.AUTO: 6 [PH]
PLATELET # BLD AUTO: 284 X10*3/UL (ref 150–450)
POTASSIUM SERPL-SCNC: 3.4 MMOL/L (ref 3.5–5.3)
PROT UR STRIP.AUTO-MCNC: NEGATIVE MG/DL
RBC # BLD AUTO: 4.62 X10*6/UL (ref 4.5–5.9)
RBC # UR STRIP.AUTO: ABNORMAL /UL
RBC #/AREA URNS AUTO: ABNORMAL /HPF
SODIUM SERPL-SCNC: 137 MMOL/L (ref 136–145)
SP GR UR STRIP.AUTO: <=1.005
SQUAMOUS #/AREA URNS AUTO: ABNORMAL /HPF
UROBILINOGEN UR STRIP.AUTO-MCNC: 0.2 MG/DL
WBC # BLD AUTO: 15 X10*3/UL (ref 4.4–11.3)
WBC #/AREA URNS AUTO: ABNORMAL /HPF

## 2024-01-29 PROCEDURE — 74176 CT ABD & PELVIS W/O CONTRAST: CPT

## 2024-01-29 PROCEDURE — 85025 COMPLETE CBC W/AUTO DIFF WBC: CPT | Performed by: STUDENT IN AN ORGANIZED HEALTH CARE EDUCATION/TRAINING PROGRAM

## 2024-01-29 PROCEDURE — 2500000004 HC RX 250 GENERAL PHARMACY W/ HCPCS (ALT 636 FOR OP/ED): Performed by: PHYSICIAN ASSISTANT

## 2024-01-29 PROCEDURE — 2500000001 HC RX 250 WO HCPCS SELF ADMINISTERED DRUGS (ALT 637 FOR MEDICARE OP): Performed by: PHYSICIAN ASSISTANT

## 2024-01-29 PROCEDURE — G0378 HOSPITAL OBSERVATION PER HR: HCPCS

## 2024-01-29 PROCEDURE — 2500000004 HC RX 250 GENERAL PHARMACY W/ HCPCS (ALT 636 FOR OP/ED): Performed by: STUDENT IN AN ORGANIZED HEALTH CARE EDUCATION/TRAINING PROGRAM

## 2024-01-29 PROCEDURE — 81001 URINALYSIS AUTO W/SCOPE: CPT | Performed by: STUDENT IN AN ORGANIZED HEALTH CARE EDUCATION/TRAINING PROGRAM

## 2024-01-29 PROCEDURE — 36415 COLL VENOUS BLD VENIPUNCTURE: CPT | Performed by: STUDENT IN AN ORGANIZED HEALTH CARE EDUCATION/TRAINING PROGRAM

## 2024-01-29 PROCEDURE — 74176 CT ABD & PELVIS W/O CONTRAST: CPT | Mod: FOREIGN READ | Performed by: RADIOLOGY

## 2024-01-29 PROCEDURE — 96360 HYDRATION IV INFUSION INIT: CPT | Mod: 59

## 2024-01-29 PROCEDURE — 80048 BASIC METABOLIC PNL TOTAL CA: CPT | Performed by: STUDENT IN AN ORGANIZED HEALTH CARE EDUCATION/TRAINING PROGRAM

## 2024-01-29 PROCEDURE — 99285 EMERGENCY DEPT VISIT HI MDM: CPT | Performed by: STUDENT IN AN ORGANIZED HEALTH CARE EDUCATION/TRAINING PROGRAM

## 2024-01-29 RX ORDER — ONDANSETRON HYDROCHLORIDE 2 MG/ML
4 INJECTION, SOLUTION INTRAVENOUS EVERY 8 HOURS PRN
Status: DISCONTINUED | OUTPATIENT
Start: 2024-01-29 | End: 2024-01-29 | Stop reason: HOSPADM

## 2024-01-29 RX ORDER — ONDANSETRON 4 MG/1
4 TABLET, ORALLY DISINTEGRATING ORAL EVERY 8 HOURS PRN
Status: DISCONTINUED | OUTPATIENT
Start: 2024-01-29 | End: 2024-01-29 | Stop reason: HOSPADM

## 2024-01-29 RX ORDER — ONDANSETRON HYDROCHLORIDE 2 MG/ML
4 INJECTION, SOLUTION INTRAVENOUS ONCE AS NEEDED
Status: CANCELLED | OUTPATIENT
Start: 2024-01-29

## 2024-01-29 RX ORDER — TAMSULOSIN HYDROCHLORIDE 0.4 MG/1
0.4 CAPSULE ORAL 2 TIMES DAILY
Qty: 14 CAPSULE | Refills: 0 | Status: SHIPPED | OUTPATIENT
Start: 2024-01-29 | End: 2024-02-05

## 2024-01-29 RX ORDER — MEPERIDINE HYDROCHLORIDE 25 MG/ML
12.5 INJECTION INTRAMUSCULAR; INTRAVENOUS; SUBCUTANEOUS EVERY 10 MIN PRN
Status: CANCELLED | OUTPATIENT
Start: 2024-01-29

## 2024-01-29 RX ORDER — IBUPROFEN 400 MG/1
400 TABLET ORAL EVERY 4 HOURS PRN
Status: DISCONTINUED | OUTPATIENT
Start: 2024-01-29 | End: 2024-01-29

## 2024-01-29 RX ORDER — KETOROLAC TROMETHAMINE 30 MG/ML
30 INJECTION, SOLUTION INTRAMUSCULAR; INTRAVENOUS ONCE
Status: COMPLETED | OUTPATIENT
Start: 2024-01-29 | End: 2024-01-29

## 2024-01-29 RX ORDER — TALC
3 POWDER (GRAM) TOPICAL DAILY PRN
Status: DISCONTINUED | OUTPATIENT
Start: 2024-01-29 | End: 2024-01-29 | Stop reason: HOSPADM

## 2024-01-29 RX ORDER — KETOROLAC TROMETHAMINE 10 MG/1
10 TABLET, FILM COATED ORAL EVERY 6 HOURS PRN
Qty: 20 TABLET | Refills: 0 | Status: SHIPPED | OUTPATIENT
Start: 2024-01-29 | End: 2024-02-22 | Stop reason: HOSPADM

## 2024-01-29 RX ORDER — TAMSULOSIN HYDROCHLORIDE 0.4 MG/1
0.4 CAPSULE ORAL 2 TIMES DAILY
Status: DISCONTINUED | OUTPATIENT
Start: 2024-01-29 | End: 2024-01-29 | Stop reason: HOSPADM

## 2024-01-29 RX ORDER — ALBUTEROL SULFATE 0.83 MG/ML
2.5 SOLUTION RESPIRATORY (INHALATION) EVERY 30 MIN PRN
Status: CANCELLED | OUTPATIENT
Start: 2024-01-29

## 2024-01-29 RX ORDER — SODIUM CHLORIDE, SODIUM LACTATE, POTASSIUM CHLORIDE, CALCIUM CHLORIDE 600; 310; 30; 20 MG/100ML; MG/100ML; MG/100ML; MG/100ML
40 INJECTION, SOLUTION INTRAVENOUS CONTINUOUS
Status: CANCELLED | OUTPATIENT
Start: 2024-01-29

## 2024-01-29 RX ORDER — LABETALOL HYDROCHLORIDE 5 MG/ML
5 INJECTION, SOLUTION INTRAVENOUS EVERY 5 MIN PRN
Status: CANCELLED | OUTPATIENT
Start: 2024-01-29

## 2024-01-29 RX ORDER — FENTANYL CITRATE 50 UG/ML
50 INJECTION, SOLUTION INTRAMUSCULAR; INTRAVENOUS EVERY 5 MIN PRN
Status: CANCELLED | OUTPATIENT
Start: 2024-01-29

## 2024-01-29 RX ORDER — SODIUM CHLORIDE, SODIUM LACTATE, POTASSIUM CHLORIDE, CALCIUM CHLORIDE 600; 310; 30; 20 MG/100ML; MG/100ML; MG/100ML; MG/100ML
100 INJECTION, SOLUTION INTRAVENOUS CONTINUOUS
Status: DISCONTINUED | OUTPATIENT
Start: 2024-01-29 | End: 2024-01-29 | Stop reason: HOSPADM

## 2024-01-29 RX ORDER — POTASSIUM CHLORIDE 1.5 G/1.58G
20 POWDER, FOR SOLUTION ORAL ONCE
Status: COMPLETED | OUTPATIENT
Start: 2024-01-29 | End: 2024-01-29

## 2024-01-29 RX ORDER — KETOROLAC TROMETHAMINE 30 MG/ML
30 INJECTION, SOLUTION INTRAMUSCULAR; INTRAVENOUS EVERY 6 HOURS PRN
Status: DISCONTINUED | OUTPATIENT
Start: 2024-01-29 | End: 2024-01-29 | Stop reason: HOSPADM

## 2024-01-29 RX ORDER — ACETAMINOPHEN 325 MG/1
650 TABLET ORAL EVERY 4 HOURS PRN
Status: DISCONTINUED | OUTPATIENT
Start: 2024-01-29 | End: 2024-01-29 | Stop reason: HOSPADM

## 2024-01-29 RX ORDER — ACETAMINOPHEN 160 MG/5ML
650 SOLUTION ORAL EVERY 4 HOURS PRN
Status: DISCONTINUED | OUTPATIENT
Start: 2024-01-29 | End: 2024-01-29 | Stop reason: HOSPADM

## 2024-01-29 RX ORDER — IPRATROPIUM BROMIDE 0.5 MG/2.5ML
500 SOLUTION RESPIRATORY (INHALATION) EVERY 30 MIN PRN
Status: CANCELLED | OUTPATIENT
Start: 2024-01-29

## 2024-01-29 RX ORDER — ACETAMINOPHEN 650 MG/1
650 SUPPOSITORY RECTAL EVERY 4 HOURS PRN
Status: DISCONTINUED | OUTPATIENT
Start: 2024-01-29 | End: 2024-01-29 | Stop reason: HOSPADM

## 2024-01-29 RX ADMIN — KETOROLAC TROMETHAMINE 30 MG: 30 INJECTION, SOLUTION INTRAMUSCULAR; INTRAVENOUS at 11:58

## 2024-01-29 RX ADMIN — SODIUM CHLORIDE, SODIUM LACTATE, POTASSIUM CHLORIDE, AND CALCIUM CHLORIDE 100 ML/HR: 600; 310; 30; 20 INJECTION, SOLUTION INTRAVENOUS at 12:00

## 2024-01-29 RX ADMIN — TAMSULOSIN HYDROCHLORIDE 0.4 MG: 0.4 CAPSULE ORAL at 11:58

## 2024-01-29 RX ADMIN — SODIUM CHLORIDE 1000 ML: 900 INJECTION, SOLUTION INTRAVENOUS at 06:43

## 2024-01-29 RX ADMIN — POTASSIUM CHLORIDE 20 MEQ: 1.5 POWDER, FOR SOLUTION ORAL at 11:58

## 2024-01-29 RX ADMIN — KETOROLAC TROMETHAMINE 30 MG: 30 INJECTION, SOLUTION INTRAMUSCULAR at 06:41

## 2024-01-29 SDOH — SOCIAL STABILITY: SOCIAL INSECURITY: DO YOU FEEL ANYONE HAS EXPLOITED OR TAKEN ADVANTAGE OF YOU FINANCIALLY OR OF YOUR PERSONAL PROPERTY?: NO

## 2024-01-29 SDOH — SOCIAL STABILITY: SOCIAL INSECURITY: DO YOU FEEL UNSAFE GOING BACK TO THE PLACE WHERE YOU ARE LIVING?: NO

## 2024-01-29 SDOH — SOCIAL STABILITY: SOCIAL INSECURITY: HAVE YOU HAD THOUGHTS OF HARMING ANYONE ELSE?: NO

## 2024-01-29 SDOH — SOCIAL STABILITY: SOCIAL INSECURITY: HAS ANYONE EVER THREATENED TO HURT YOUR FAMILY OR YOUR PETS?: NO

## 2024-01-29 SDOH — SOCIAL STABILITY: SOCIAL INSECURITY: ARE THERE ANY APPARENT SIGNS OF INJURIES/BEHAVIORS THAT COULD BE RELATED TO ABUSE/NEGLECT?: NO

## 2024-01-29 SDOH — SOCIAL STABILITY: SOCIAL INSECURITY: ARE YOU OR HAVE YOU BEEN THREATENED OR ABUSED PHYSICALLY, EMOTIONALLY, OR SEXUALLY BY ANYONE?: NO

## 2024-01-29 SDOH — HEALTH STABILITY: MENTAL HEALTH: CURRENT SMOKER: 0

## 2024-01-29 SDOH — SOCIAL STABILITY: SOCIAL INSECURITY: ABUSE: ADULT

## 2024-01-29 SDOH — SOCIAL STABILITY: SOCIAL INSECURITY: DOES ANYONE TRY TO KEEP YOU FROM HAVING/CONTACTING OTHER FRIENDS OR DOING THINGS OUTSIDE YOUR HOME?: NO

## 2024-01-29 ASSESSMENT — COGNITIVE AND FUNCTIONAL STATUS - GENERAL
PATIENT BASELINE BEDBOUND: NO
DAILY ACTIVITIY SCORE: 24
MOBILITY SCORE: 24

## 2024-01-29 ASSESSMENT — PAIN SCALES - GENERAL
PAINLEVEL_OUTOF10: 4
PAINLEVEL_OUTOF10: 1
PAINLEVEL_OUTOF10: 1
PAINLEVEL_OUTOF10: 6
PAINLEVEL_OUTOF10: 7
PAINLEVEL_OUTOF10: 4

## 2024-01-29 ASSESSMENT — LIFESTYLE VARIABLES
HOW MANY STANDARD DRINKS CONTAINING ALCOHOL DO YOU HAVE ON A TYPICAL DAY: 3 OR 4
AUDIT-C TOTAL SCORE: 3
HAVE YOU OR SOMEONE ELSE BEEN INJURED AS A RESULT OF YOUR DRINKING: NO
HOW OFTEN DURING THE LAST YEAR HAVE YOU FOUND THAT YOU WERE NOT ABLE TO STOP DRINKING ONCE YOU HAD STARTED: NEVER
HOW OFTEN DURING THE LAST YEAR HAVE YOU NEEDED AN ALCOHOLIC DRINK FIRST THING IN THE MORNING TO GET YOURSELF GOING AFTER A NIGHT OF HEAVY DRINKING: NEVER
AUDIT-C TOTAL SCORE: 3
AUDIT TOTAL SCORE: 3
HOW OFTEN DURING THE LAST YEAR HAVE YOU BEEN UNABLE TO REMEMBER WHAT HAPPENED THE NIGHT BEFORE BECAUSE YOU HAD BEEN DRINKING: NEVER
HOW OFTEN DO YOU HAVE 6 OR MORE DRINKS ON ONE OCCASION: NEVER
AUDIT TOTAL SCORE: 0
SKIP TO QUESTIONS 9-10: 0
HAS A RELATIVE, FRIEND, DOCTOR, OR ANOTHER HEALTH PROFESSIONAL EXPRESSED CONCERN ABOUT YOUR DRINKING OR SUGGESTED YOU CUT DOWN: NO
HOW OFTEN DURING THE LAST YEAR HAVE YOU HAD A FEELING OF GUILT OR REMORSE AFTER DRINKING: NEVER
HOW OFTEN DURING THE LAST YEAR HAVE YOU FAILED TO DO WHAT WAS NORMALLY EXPECTED FROM YOU BECAUSE OF DRINKING: NEVER
HOW OFTEN DO YOU HAVE A DRINK CONTAINING ALCOHOL: 2-4 TIMES A MONTH

## 2024-01-29 ASSESSMENT — ENCOUNTER SYMPTOMS
NAUSEA: 0
CARDIOVASCULAR NEGATIVE: 1
ABDOMINAL PAIN: 1
VOMITING: 0
FEVER: 0
RESPIRATORY NEGATIVE: 1
FLANK PAIN: 1
CHILLS: 0

## 2024-01-29 ASSESSMENT — PAIN - FUNCTIONAL ASSESSMENT
PAIN_FUNCTIONAL_ASSESSMENT: 0-10

## 2024-01-29 ASSESSMENT — PAIN DESCRIPTION - DESCRIPTORS
DESCRIPTORS: ACHING;DULL
DESCRIPTORS: ACHING

## 2024-01-29 ASSESSMENT — COLUMBIA-SUICIDE SEVERITY RATING SCALE - C-SSRS
1. IN THE PAST MONTH, HAVE YOU WISHED YOU WERE DEAD OR WISHED YOU COULD GO TO SLEEP AND NOT WAKE UP?: NO
2. HAVE YOU ACTUALLY HAD ANY THOUGHTS OF KILLING YOURSELF?: NO
6. HAVE YOU EVER DONE ANYTHING, STARTED TO DO ANYTHING, OR PREPARED TO DO ANYTHING TO END YOUR LIFE?: NO

## 2024-01-29 ASSESSMENT — PAIN DESCRIPTION - DIRECTION: RADIATING_TOWARDS: R FLANK

## 2024-01-29 ASSESSMENT — ACTIVITIES OF DAILY LIVING (ADL)
TOILETING: INDEPENDENT
DRESSING YOURSELF: INDEPENDENT
BATHING: INDEPENDENT
JUDGMENT_ADEQUATE_SAFELY_COMPLETE_DAILY_ACTIVITIES: YES
ADEQUATE_TO_COMPLETE_ADL: YES
HEARING - RIGHT EAR: FUNCTIONAL
GROOMING: INDEPENDENT
LACK_OF_TRANSPORTATION: NO
FEEDING YOURSELF: INDEPENDENT
WALKS IN HOME: INDEPENDENT
PATIENT'S MEMORY ADEQUATE TO SAFELY COMPLETE DAILY ACTIVITIES?: YES
HEARING - LEFT EAR: FUNCTIONAL

## 2024-01-29 ASSESSMENT — PAIN DESCRIPTION - FREQUENCY: FREQUENCY: INTERMITTENT

## 2024-01-29 ASSESSMENT — PATIENT HEALTH QUESTIONNAIRE - PHQ9
1. LITTLE INTEREST OR PLEASURE IN DOING THINGS: NOT AT ALL
2. FEELING DOWN, DEPRESSED OR HOPELESS: NOT AT ALL
SUM OF ALL RESPONSES TO PHQ9 QUESTIONS 1 & 2: 0

## 2024-01-29 ASSESSMENT — PAIN DESCRIPTION - PAIN TYPE: TYPE: ACUTE PAIN

## 2024-01-29 ASSESSMENT — PAIN DESCRIPTION - ORIENTATION: ORIENTATION: RIGHT

## 2024-01-29 NOTE — CARE PLAN
The patient's goals for the shift include  minimum pain     The clinical goals for the shift include  minimum pain

## 2024-01-29 NOTE — CONSULTS
"Reason For Consult  Ureteral stone    History Of Present Illness  Hima Mesa is a 23 y.o. male presenting with acute right flank pain.  CT scan done reveals an obstructing 2 mm right distal ureteral stone, and a 9 mm non-obstructive stone.  He denies any N/V, fevers or chills.  He reports having lithotripsy in the past for his stones.     Past Medical History  He has a past medical history of Classic migraine with aura (04/10/2023), H/O renal calculi, Infectious mononucleosis (02/25/2019), Staph skin infection (04/10/2023), and Unspecified papilledema (06/18/2015).    Surgical History  He has no past surgical history on file.     Social History  He reports that he has been smoking cigarettes. He has never been exposed to tobacco smoke. He has never used smokeless tobacco. He reports that he does not currently use alcohol. He reports that he does not currently use drugs after having used the following drugs: Amphetamines, Cocaine, Hydrocodone, Marijuana, and Oxycodone.    Family History  Family History   Problem Relation Name Age of Onset    No Known Problems Mother Faby     Hypertension Father Jason     Anxiety disorder Brother Daren     Macular degeneration Maternal Great-Grandmother          Allergies  Cat's claw    Review of Systems  Review of Systems   Constitutional: Negative for chills and fever.   Cardiovascular: Negative.    Respiratory: Negative.     Gastrointestinal:  Positive for abdominal pain. Negative for nausea and vomiting.   Genitourinary:  Positive for flank pain, pelvic pain and urgency.          Physical Exam  Awake, alert, no distress  Breathing comfortably, respirations unlabored  Abdomen soft, ND, NT  Ext warm, well perfused       Last Recorded Vitals  Blood pressure 131/80, pulse 69, temperature 36.2 °C (97.2 °F), temperature source Temporal, resp. rate 18, height 1.778 m (5' 10\"), weight 91.6 kg (201 lb 15.1 oz), SpO2 98 %.    Relevant Results      Results for orders placed or " performed during the hospital encounter of 01/29/24 (from the past 24 hour(s))   CBC and Auto Differential   Result Value Ref Range    WBC 15.0 (H) 4.4 - 11.3 x10*3/uL    nRBC      RBC 4.62 4.50 - 5.90 x10*6/uL    Hemoglobin 14.8 13.5 - 17.5 g/dL    Hematocrit 40.3 (L) 41.0 - 52.0 %    MCV 87 80 - 100 fL    MCH 32.0 26.0 - 34.0 pg    MCHC 36.7 (H) 32.0 - 36.0 g/dL    RDW 11.7 11.5 - 14.5 %    Platelets 284 150 - 450 x10*3/uL    Neutrophils % 77.9 40.0 - 80.0 %    Immature Granulocytes %, Automated 0.3 0.0 - 0.9 %    Lymphocytes % 11.5 13.0 - 44.0 %    Monocytes % 9.4 2.0 - 10.0 %    Eosinophils % 0.8 0.0 - 6.0 %    Basophils % 0.1 0.0 - 2.0 %    Neutrophils Absolute 11.64 (H) 1.20 - 7.70 x10*3/uL    Immature Granulocytes Absolute, Automated 0.04 0.00 - 0.70 x10*3/uL    Lymphocytes Absolute 1.72 1.20 - 4.80 x10*3/uL    Monocytes Absolute 1.41 (H) 0.10 - 1.00 x10*3/uL    Eosinophils Absolute 0.12 0.00 - 0.70 x10*3/uL    Basophils Absolute 0.02 0.00 - 0.10 x10*3/uL   Basic metabolic panel   Result Value Ref Range    Glucose 104 (H) 74 - 99 mg/dL    Sodium 137 136 - 145 mmol/L    Potassium 3.4 (L) 3.5 - 5.3 mmol/L    Chloride 102 98 - 107 mmol/L    Bicarbonate 23 21 - 32 mmol/L    Anion Gap 15 10 - 20 mmol/L    Urea Nitrogen 22 6 - 23 mg/dL    Creatinine 1.27 0.50 - 1.30 mg/dL    eGFR 82 >60 mL/min/1.73m*2    Calcium 9.4 8.6 - 10.3 mg/dL   Urinalysis with Reflex Culture and Microscopic   Result Value Ref Range    Color, Urine Yellow Straw, Yellow    Appearance, Urine Clear Clear    Specific Gravity, Urine <=1.005 (N) 1.005 - 1.035    pH, Urine 6.0 5.0, 5.5, 6.0, 6.5, 7.0, 7.5, 8.0    Protein, Urine NEGATIVE NEGATIVE, TRACE mg/dL    Glucose, Urine NEGATIVE NEGATIVE mg/dL    Blood, Urine LARGE (3+) (A) NEGATIVE    Ketones, Urine NEGATIVE NEGATIVE mg/dL    Bilirubin, Urine NEGATIVE NEGATIVE    Urobilinogen, Urine 0.2 0.2, 1.0 mg/dL    Nitrite, Urine NEGATIVE NEGATIVE    Leukocyte Esterase, Urine NEGATIVE NEGATIVE    Urinalysis Microscopic   Result Value Ref Range    WBC, Urine NONE 1-5, NONE /HPF    RBC, Urine 11-20 (A) NONE, 1-2, 3-5 /HPF    Squamous Epithelial Cells, Urine 10-25 (FEW) Reference range not established. /HPF          Assessment/Plan     Symptomatic right ureteral stone.     CT reviewed.  His ureteral stone is quiet small, has good chance of spontaneous passage.  Advised liberal hydration, Flomax.  The 9 mm stone is non-obstructive, may be managed electively in the near future.         I spent 30 minutes in the professional and overall care of this patient.      Chago Almonte MD

## 2024-01-29 NOTE — H&P
History and Physical    Hima Mesa is a 23 y.o. male on day 1 of admission presenting with right sided ureteral stone.   Room: 223    Subjective   23 year old male with a history of bilateral kidney stones (prior B/L ESWL in 2023) [facility] w/ right sided flank pain. A/p CT positive for Mild to moderate right-sided hydronephrosis and mild hydroureter secondary to an obstructing 2 mm calculus at the right ureterovesicular junction. Additional nonobstructing right renal calculi measuring up to 9 mm in diameter.   He was given ketorolac and 1 L NS.   UA was negative for infection.   Potassium low 3.4 (it's since been supplemented w/ 20mEq KCl).   Renal function WNL.   Elevated WBC 15    He was admitted and taken down to OR for a stone that was relayed as 4mm but since it's 2mm (per CT) conservative measures will be taken instead. The 9mm NONobstructive stone will be pursued on outpatient basis.     Admitted for pain control.     PMHx:  Past Medical History:   Diagnosis Date    ADHD     Anxiety     Classic migraine with aura 04/10/2023    H/O renal calculi     Infectious mononucleosis 02/25/2019    Staph skin infection 04/10/2023    Unspecified papilledema 06/18/2015    Optic nerve edema      Past Surgical Hx:  Past Surgical History:  05/04/2023: EXTRACORPOREAL SHOCK WAVE LITHOTRIPSY; Left  04/13/2023: EXTRACORPOREAL SHOCK WAVE LITHOTRIPSY; Right    Social history: Hx of marijuana and methamphetamine use   reports that he has been smoking cigarettes. He has never been exposed to tobacco smoke. He has never used smokeless tobacco. He reports that he does not currently use alcohol. He reports that he does not currently use drugs after having used the following drugs: Amphetamines, Cocaine, Hydrocodone, Marijuana, and Oxycodone.    Family history:   Family History   Problem Relation Name Age of Onset    No Known Problems Mother Faby     Hypertension Father Jason     Anxiety disorder Brother Daren      "Macular degeneration Maternal Great-Grandmother         Allergies   Allergen Reactions    Cat's Claw Runny nose       Home Medication: None listed    Last Recorded Vitals  Blood pressure 131/80, pulse 69, temperature 36.2 °C (97.2 °F), temperature source Temporal, resp. rate 18, height 1.778 m (5' 10\"), weight 91.6 kg (201 lb 15.1 oz), SpO2 98 %.    Physical Exam  General: Patient in no acute distress, eating food  HEENT: EOMI, moist mucous membranes, anicteric  Neck: No JVD, no cervical lymphadenopathy  Heart: RRR, no murmurs, rubs, or gallops  Lungs: Clear to auscultation bilaterally, no wheezing, rhonchi, or rales  Abdomen: Soft, nontender, nondistended, no organomegaly  : Neg CVA tenderness on percussion bilaterally  Extremities: No peripheral edema,   Skin: No rash or cyanosis  Neurological: AOx3, non focal  Psychiatric: Cooperative and pleasant    Recent Results:  Results for orders placed or performed during the hospital encounter of 01/29/24 (from the past 24 hour(s))   CBC and Auto Differential   Result Value Ref Range    WBC 15.0 (H) 4.4 - 11.3 x10*3/uL    nRBC      RBC 4.62 4.50 - 5.90 x10*6/uL    Hemoglobin 14.8 13.5 - 17.5 g/dL    Hematocrit 40.3 (L) 41.0 - 52.0 %    MCV 87 80 - 100 fL    MCH 32.0 26.0 - 34.0 pg    MCHC 36.7 (H) 32.0 - 36.0 g/dL    RDW 11.7 11.5 - 14.5 %    Platelets 284 150 - 450 x10*3/uL    Neutrophils % 77.9 40.0 - 80.0 %    Immature Granulocytes %, Automated 0.3 0.0 - 0.9 %    Lymphocytes % 11.5 13.0 - 44.0 %    Monocytes % 9.4 2.0 - 10.0 %    Eosinophils % 0.8 0.0 - 6.0 %    Basophils % 0.1 0.0 - 2.0 %    Neutrophils Absolute 11.64 (H) 1.20 - 7.70 x10*3/uL    Immature Granulocytes Absolute, Automated 0.04 0.00 - 0.70 x10*3/uL    Lymphocytes Absolute 1.72 1.20 - 4.80 x10*3/uL    Monocytes Absolute 1.41 (H) 0.10 - 1.00 x10*3/uL    Eosinophils Absolute 0.12 0.00 - 0.70 x10*3/uL    Basophils Absolute 0.02 0.00 - 0.10 x10*3/uL   Basic metabolic panel   Result Value Ref Range    " Glucose 104 (H) 74 - 99 mg/dL    Sodium 137 136 - 145 mmol/L    Potassium 3.4 (L) 3.5 - 5.3 mmol/L    Chloride 102 98 - 107 mmol/L    Bicarbonate 23 21 - 32 mmol/L    Anion Gap 15 10 - 20 mmol/L    Urea Nitrogen 22 6 - 23 mg/dL    Creatinine 1.27 0.50 - 1.30 mg/dL    eGFR 82 >60 mL/min/1.73m*2    Calcium 9.4 8.6 - 10.3 mg/dL   Urinalysis with Reflex Culture and Microscopic   Result Value Ref Range    Color, Urine Yellow Straw, Yellow    Appearance, Urine Clear Clear    Specific Gravity, Urine <=1.005 (N) 1.005 - 1.035    pH, Urine 6.0 5.0, 5.5, 6.0, 6.5, 7.0, 7.5, 8.0    Protein, Urine NEGATIVE NEGATIVE, TRACE mg/dL    Glucose, Urine NEGATIVE NEGATIVE mg/dL    Blood, Urine LARGE (3+) (A) NEGATIVE    Ketones, Urine NEGATIVE NEGATIVE mg/dL    Bilirubin, Urine NEGATIVE NEGATIVE    Urobilinogen, Urine 0.2 0.2, 1.0 mg/dL    Nitrite, Urine NEGATIVE NEGATIVE    Leukocyte Esterase, Urine NEGATIVE NEGATIVE   Urinalysis Microscopic   Result Value Ref Range    WBC, Urine NONE 1-5, NONE /HPF    RBC, Urine 11-20 (A) NONE, 1-2, 3-5 /HPF    Squamous Epithelial Cells, Urine 10-25 (FEW) Reference range not established. /HPF        CT abdomen pelvis wo IV contrast   Final Result   Mild to moderate right-sided hydronephrosis and mild hydroureter   secondary to an obstructing 2 mm calculus at the right   ureterovesicular junction.   Additional nonobstructing right renal calculi measuring up to 9 mm in   diameter.   Signed by Sander Rodarte           Assessment/Plan   1) Right sided 2mm stone at UVJ w/ mild-mod hydronephrosis and mild hydroureter   Reg diet  Continuous IV fluids since patient has been fasting  Pain control   Flomax twice daily  2) Leukocytosis (WBC 15)   Likely reactive since UA is negative for nitrites and leukocyte esterase and Micro is neg for WBCs  3) DVT ppx   Low risk - ambulate       I spent 35 minutes in the professional and overall care of this patient.      Gala Clarke PA-C

## 2024-01-29 NOTE — PRE-PROCEDURE NOTE
Dr. Almonte in to see patient. Surgery cancelled due to size of stone and location of larger stone. Pt returned to [facility] for discharge. CHUYITA Mcmahon in to speak with patient and mother.

## 2024-01-29 NOTE — NURSING NOTE
Pt surgery cancelled due to miscommunication from Dr. Lake to Dr. Ahmadi per Dr. Almonte regarding size of stone. Plan for patient to be discharged and allow 2mm stone to pass and follow-up outpatient to address larger stone. Patient and mother verbalized concern regarding being billed for inpatient stay since admitted and then surgery cancelled. Informed that they should not see charges, contact information given for follow-up if necessary.

## 2024-01-29 NOTE — ANESTHESIA PREPROCEDURE EVALUATION
Patient: Hima Mesa    Procedure Information       Date/Time: 01/29/24 1150    Procedure: Cystoscopy with Lithotripsy Laser (Right)    Location: [facility] / [facility]    Surgeons: Chago Almonte MD          Past Medical History:   Diagnosis Date    Classic migraine with aura 04/10/2023    H/O renal calculi     Infectious mononucleosis 02/25/2019    Staph skin infection 04/10/2023    Unspecified papilledema 06/18/2015    Optic nerve edema     History reviewed. No pertinent surgical history.    Relevant Problems   /Renal   (+) Calculus of kidney with calculus of ureter   (+) Kidney stone on right side      Neuro/Psych   (+) Anxiety       Clinical information reviewed:   Tobacco  Allergies  Meds  Problems  Med Hx  Surg Hx   Fam Hx  Soc   Hx        NPO Detail:  No data recorded     Physical Exam    Airway  Mallampati: II  TM distance: >3 FB  Neck ROM: full     Cardiovascular    Dental    Pulmonary    Abdominal            Anesthesia Plan    History of general anesthesia?: yes  History of complications of general anesthesia?: no    ASA 2     general     The patient is not a current smoker.  Patient was not previously instructed to abstain from smoking on day of procedure.  Patient did not smoke on day of procedure.  Education provided regarding risk of obstructive sleep apnea.  intravenous induction   Postoperative administration of opioids is intended.  Anesthetic plan and risks discussed with patient.  Use of blood products discussed with patient who consented to blood products.    Plan discussed with CRNA and CAA.

## 2024-01-29 NOTE — NURSING NOTE
Pre-op called to floor and was instructed to bring patient down. Pt wheeled down via bed. Bedside report given at this time. No further needs

## 2024-01-29 NOTE — H&P (VIEW-ONLY)
History and Physical    Hima eMsa is a 23 y.o. male on day 1 of admission presenting with right sided ureteral stone.   Room: 223    Subjective   23 year old male with a history of bilateral kidney stones (prior B/L ESWL in 2023) presents to [facility] w/ right sided flank pain. A/p CT positive for Mild to moderate right-sided hydronephrosis and mild hydroureter secondary to an obstructing 2 mm calculus at the right ureterovesicular junction. Additional nonobstructing right renal calculi measuring up to 9 mm in diameter.   He was given ketorolac and 1 L NS.   UA was negative for infection.   Potassium low 3.4 (it's since been supplemented w/ 20mEq KCl).   Renal function WNL.   Elevated WBC 15    He was admitted and taken down to OR for a stone that was relayed as 4mm but since it's 2mm (per CT) conservative measures will be taken instead. The 9mm NONobstructive stone will be pursued on outpatient basis.     Admitted for pain control.     PMHx:  Past Medical History:   Diagnosis Date    ADHD     Anxiety     Classic migraine with aura 04/10/2023    H/O renal calculi     Infectious mononucleosis 02/25/2019    Staph skin infection 04/10/2023    Unspecified papilledema 06/18/2015    Optic nerve edema      Past Surgical Hx:  Past Surgical History:  05/04/2023: EXTRACORPOREAL SHOCK WAVE LITHOTRIPSY; Left  04/13/2023: EXTRACORPOREAL SHOCK WAVE LITHOTRIPSY; Right    Social history: Hx of marijuana and methamphetamine use   reports that he has been smoking cigarettes. He has never been exposed to tobacco smoke. He has never used smokeless tobacco. He reports that he does not currently use alcohol. He reports that he does not currently use drugs after having used the following drugs: Amphetamines, Cocaine, Hydrocodone, Marijuana, and Oxycodone.    Family history:   Family History   Problem Relation Name Age of Onset    No Known Problems Mother Faby     Hypertension Father Jason     Anxiety disorder Brother Daren      "Macular degeneration Maternal Great-Grandmother         Allergies   Allergen Reactions    Cat's Claw Runny nose       Home Medication: None listed    Last Recorded Vitals  Blood pressure 131/80, pulse 69, temperature 36.2 °C (97.2 °F), temperature source Temporal, resp. rate 18, height 1.778 m (5' 10\"), weight 91.6 kg (201 lb 15.1 oz), SpO2 98 %.    Physical Exam  General: Patient in no acute distress, eating food  HEENT: EOMI, moist mucous membranes, anicteric  Neck: No JVD, no cervical lymphadenopathy  Heart: RRR, no murmurs, rubs, or gallops  Lungs: Clear to auscultation bilaterally, no wheezing, rhonchi, or rales  Abdomen: Soft, nontender, nondistended, no organomegaly  : Neg CVA tenderness on percussion bilaterally  Extremities: No peripheral edema,   Skin: No rash or cyanosis  Neurological: AOx3, non focal  Psychiatric: Cooperative and pleasant    Recent Results:  Results for orders placed or performed during the hospital encounter of 01/29/24 (from the past 24 hour(s))   CBC and Auto Differential   Result Value Ref Range    WBC 15.0 (H) 4.4 - 11.3 x10*3/uL    nRBC      RBC 4.62 4.50 - 5.90 x10*6/uL    Hemoglobin 14.8 13.5 - 17.5 g/dL    Hematocrit 40.3 (L) 41.0 - 52.0 %    MCV 87 80 - 100 fL    MCH 32.0 26.0 - 34.0 pg    MCHC 36.7 (H) 32.0 - 36.0 g/dL    RDW 11.7 11.5 - 14.5 %    Platelets 284 150 - 450 x10*3/uL    Neutrophils % 77.9 40.0 - 80.0 %    Immature Granulocytes %, Automated 0.3 0.0 - 0.9 %    Lymphocytes % 11.5 13.0 - 44.0 %    Monocytes % 9.4 2.0 - 10.0 %    Eosinophils % 0.8 0.0 - 6.0 %    Basophils % 0.1 0.0 - 2.0 %    Neutrophils Absolute 11.64 (H) 1.20 - 7.70 x10*3/uL    Immature Granulocytes Absolute, Automated 0.04 0.00 - 0.70 x10*3/uL    Lymphocytes Absolute 1.72 1.20 - 4.80 x10*3/uL    Monocytes Absolute 1.41 (H) 0.10 - 1.00 x10*3/uL    Eosinophils Absolute 0.12 0.00 - 0.70 x10*3/uL    Basophils Absolute 0.02 0.00 - 0.10 x10*3/uL   Basic metabolic panel   Result Value Ref Range    " Glucose 104 (H) 74 - 99 mg/dL    Sodium 137 136 - 145 mmol/L    Potassium 3.4 (L) 3.5 - 5.3 mmol/L    Chloride 102 98 - 107 mmol/L    Bicarbonate 23 21 - 32 mmol/L    Anion Gap 15 10 - 20 mmol/L    Urea Nitrogen 22 6 - 23 mg/dL    Creatinine 1.27 0.50 - 1.30 mg/dL    eGFR 82 >60 mL/min/1.73m*2    Calcium 9.4 8.6 - 10.3 mg/dL   Urinalysis with Reflex Culture and Microscopic   Result Value Ref Range    Color, Urine Yellow Straw, Yellow    Appearance, Urine Clear Clear    Specific Gravity, Urine <=1.005 (N) 1.005 - 1.035    pH, Urine 6.0 5.0, 5.5, 6.0, 6.5, 7.0, 7.5, 8.0    Protein, Urine NEGATIVE NEGATIVE, TRACE mg/dL    Glucose, Urine NEGATIVE NEGATIVE mg/dL    Blood, Urine LARGE (3+) (A) NEGATIVE    Ketones, Urine NEGATIVE NEGATIVE mg/dL    Bilirubin, Urine NEGATIVE NEGATIVE    Urobilinogen, Urine 0.2 0.2, 1.0 mg/dL    Nitrite, Urine NEGATIVE NEGATIVE    Leukocyte Esterase, Urine NEGATIVE NEGATIVE   Urinalysis Microscopic   Result Value Ref Range    WBC, Urine NONE 1-5, NONE /HPF    RBC, Urine 11-20 (A) NONE, 1-2, 3-5 /HPF    Squamous Epithelial Cells, Urine 10-25 (FEW) Reference range not established. /HPF        CT abdomen pelvis wo IV contrast   Final Result   Mild to moderate right-sided hydronephrosis and mild hydroureter   secondary to an obstructing 2 mm calculus at the right   ureterovesicular junction.   Additional nonobstructing right renal calculi measuring up to 9 mm in   diameter.   Signed by Sander Rodarte           Assessment/Plan   1) Right sided 2mm stone at UVJ w/ mild-mod hydronephrosis and mild hydroureter   Reg diet  Continuous IV fluids since patient has been fasting  Pain control   Flomax twice daily  2) Leukocytosis (WBC 15)   Likely reactive since UA is negative for nitrites and leukocyte esterase and Micro is neg for WBCs  3) DVT ppx   Low risk - ambulate       I spent 35 minutes in the professional and overall care of this patient.      Gala Clarke PA-C

## 2024-01-29 NOTE — CARE PLAN
The patient's goals for the shift include  reduced pain     The clinical goals for the shift include reduced pain

## 2024-01-29 NOTE — NURSING NOTE
Assumed care of pt from ED, RN. Pt oriented to environment at this time. IVF infusing per orders. Per ED, RN pt is expected to go down to surgery for a lithotripsy later on today as a add on. Pt is aware and have been NPO since 11pm last night. Pt informed to strain urine. Call light within reach.

## 2024-01-29 NOTE — NURSING NOTE
Pt returned to floor from pre-op at this time. Pt c/o 4/10 right flank pain. Will medicate appropriately. Denies any further needs. Call light within reach.

## 2024-01-29 NOTE — ED PROVIDER NOTES
HPI   Chief Complaint   Patient presents with    Flank Pain     R side. Hx renal calculii       This is a 23-year-old male with history of renal stones, twice requiring lithotripsy, who presents to the ED with right flank pain that started about 7 hours ago.  Currently rates his pain 7 out of 10.  Also endorses frequency and urgency without hematuria or dysuria.  Denies nausea or other associated symptoms.  States that it feels difficult to void.  Last kidney stone was in July and was 7 mm.      History provided by:  Patient   used: No                        No data recorded                Patient History   Past Medical History:   Diagnosis Date    Classic migraine with aura 04/10/2023    H/O renal calculi     Infectious mononucleosis 02/25/2019    Staph skin infection 04/10/2023    Unspecified papilledema 06/18/2015    Optic nerve edema     History reviewed. No pertinent surgical history.  Family History   Problem Relation Name Age of Onset    No Known Problems Mother Faby     Hypertension Father Jason     Anxiety disorder Brother Daren     Macular degeneration Maternal Great-Grandmother       Social History     Tobacco Use    Smoking status: Some Days     Types: Cigarettes     Passive exposure: Never    Smokeless tobacco: Never   Substance Use Topics    Alcohol use: Not Currently    Drug use: Not Currently     Types: Amphetamines, Cocaine, Hydrocodone, Marijuana, Oxycodone       Physical Exam   ED Triage Vitals [01/29/24 0627]   Temperature Heart Rate Respirations BP   36.5 °C (97.7 °F) 77 14 (!) 151/92      Pulse Ox Temp Source Heart Rate Source Patient Position   99 % Oral Monitor --      BP Location FiO2 (%)     Left arm --       Physical Exam  Vitals and nursing note reviewed.   Constitutional:       General: He is not in acute distress.     Appearance: He is well-developed.   HENT:      Head: Normocephalic and atraumatic.   Eyes:      Conjunctiva/sclera: Conjunctivae normal.    Cardiovascular:      Rate and Rhythm: Normal rate and regular rhythm.      Heart sounds: No murmur heard.  Pulmonary:      Effort: Pulmonary effort is normal. No respiratory distress.      Breath sounds: Normal breath sounds.   Abdominal:      General: Abdomen is flat. Bowel sounds are normal.      Palpations: Abdomen is soft.      Tenderness: There is no abdominal tenderness. There is right CVA tenderness.   Musculoskeletal:         General: No swelling.      Cervical back: Neck supple.   Skin:     General: Skin is warm and dry.   Neurological:      General: No focal deficit present.      Mental Status: He is alert. Mental status is at baseline.   Psychiatric:         Mood and Affect: Mood normal.       ED Course & MDM   Diagnoses as of 01/29/24 0925   Calculus of kidney with calculus of ureter   Flank pain       Medical Decision Making  Patient recently received a liter of IV normal saline, along with ketorolac IV.  His right flank pain decreased from 7 over 10-4/10.    Case was reviewed with the patient's urologist, Deniz Ahmadi MD who recommended admission for possible definitive surgical intervention later today.  Patient is agreeable.  He is NPO.  Will continue IV fluids.    Dietary counseling was provided.  The patient was strongly advised to decrease the amount of his carbonated beverage intake.   Tobacco cessation counseling was also provided    The patient is admitted under observation status.    Amount and/or Complexity of Data Reviewed  Independent Historian: parent  External Data Reviewed: labs and notes.  Labs: ordered. Decision-making details documented in ED Course.  Radiology: ordered. Decision-making details documented in ED Course.        Procedure  Procedures     Dewayne Lake MD  01/29/24 0927       Dewayne Lake MD  01/29/24 0918

## 2024-01-29 NOTE — DISCHARGE SUMMARY
Discharge Diagnosis  2mm Right ureteral stone with obstruction    Issues Requiring Follow-Up  2mm Right ureteral stone with obstruction  9mm right ureteral stone without obstruction    Test Results Pending At Discharge  Pending Labs       Order Current Status    Extra Urine Gray Tube In process    Urinalysis with Reflex Culture and Microscopic In process            Hospital Course   23 year old male with a history of bilateral kidney stones (prior B/L ESWL in 2023) presents to [facility] [place] [facility] w/ right sided flank pain. A/p CT positive for Mild to moderate right-sided hydronephrosis and mild hydroureter secondary to an obstructing 2 mm calculus at the right ureterovesicular junction. Additional nonobstructing right renal calculi measuring up to 9 mm in diameter.   He was given ketorolac and 1 L NS.   UA was negative for infection.   Potassium low 3.4 (it's since been supplemented w/ 20mEq KCl).   Renal function WNL.   Elevated WBC 15     He was admitted and taken down to OR for a stone that was relayed as 4mm but since it's 2mm (per CT) conservative measures will be taken instead. The 9mm NONobstructive stone will be pursued on outpatient basis.      Admitted for pain control and stayed asymptomatic. He was discharged on BID flomax and 10mg toradol every 6 hours as needed. Follow up with Dr Ahmadi.     Pertinent Physical Exam At Time of Discharge  Physical Exam  General: No acute distress, alert & oriented    Cardiac: RRR, NL S1 and S2, no murmurs, rubs or gallops    Pulmonary: Lungs clear to auscultation bilaterally, no wheezes, rhales or rhonchi    Abdomen: Soft, non-tender, non-distended    : Negative CVA tenderness bilaterally with percussion    Extremities: No clubbing , cyanosis or edema.     Home Medications     Medication List      START taking these medications     ketorolac 10 mg tablet; Commonly known as: Toradol; Take 1 tablet (10   mg) by mouth every 6 hours if needed for moderate pain (4 - 6).   tamsulosin 0.4  mg 24 hr capsule; Commonly known as: Flomax; Take 1   capsule (0.4 mg) by mouth 2 times a day for 7 days.     CONTINUE taking these medications     traZODone 50 mg tablet; Commonly known as: Desyrel; Take 1-2 tabs PO at   bedtime for insomnia.       Outpatient Follow-Up  Future Appointments   Date Time Provider Department Center   3/8/2024  9:00 AM Doug Lester MD [id number]       Gala Clarke PA-C

## 2024-01-29 NOTE — NURSING NOTE
Discharge instructions and medications with patient reviewed at the bedside. Pt and family verbalized understanding. IV discontinued by AMAN Butcher without incidence. Tip intact and pressure applied. Pt wheeled down via wheelchair by AMAN Butcher and assisted into vehicle.

## 2024-01-30 ENCOUNTER — HOSPITAL ENCOUNTER (EMERGENCY)
Facility: HOSPITAL | Age: 24
Discharge: OTHER INSTITUTION | End: 2024-01-30
Payer: COMMERCIAL

## 2024-01-30 PROCEDURE — 4500999001 HC ED NO CHARGE

## 2024-02-01 ENCOUNTER — TELEPHONE (OUTPATIENT)
Dept: INPATIENT UNIT | Facility: HOSPITAL | Age: 24
End: 2024-02-01

## 2024-02-05 ENCOUNTER — TELEMEDICINE CLINICAL SUPPORT (OUTPATIENT)
Dept: PREADMISSION TESTING | Facility: HOSPITAL | Age: 24
End: 2024-02-05
Payer: COMMERCIAL

## 2024-02-05 NOTE — PREPROCEDURE INSTRUCTIONS
Medication List            Accurate as of February 5, 2024  3:42 PM. Always use your most recent med list.                ketorolac 10 mg tablet  Commonly known as: Toradol  Take 1 tablet (10 mg) by mouth every 6 hours if needed for moderate pain (4 - 6).  Hold 7 days before surgery   tamsulosin 0.4 mg 24 hr capsule  Commonly known as: Flomax  Take 1 capsule (0.4 mg) by mouth 2 times a day for 7 days.  Take as directed   traZODone 50 mg tablet  Commonly known as: Desyrel  Take 1-2 tabs PO at bedtime for insomnia.  Take as directed                            NPO Instructions:    Do not eat any food after midnight the night before your surgery/procedure.    Additional Instructions:     Seven/Six Days before Surgery:  Review your medication instructions, stop indicated medications  Five Days before Surgery:  Review your medication instructions, stop indicated medications  Three Days before Surgery:  Review your medication instructions, stop indicated medications  The Day before Surgery:  Review your medication instructions, stop indicated medications  You will be contacted regarding the time of your arrival to facility and surgery time  Do not eat any food after Midnight  Day of Surgery:  Review your medication instructions, take indicated medications  Wear  comfortable loose fitting clothing  Do not use moisturizers, creams, lotions or perfume  All jewelry and valuables should be left at home    PAT DISCHARGE INSTRUCTIONS    Please call the Same Day Surgery (SDS) Department of the hospital where your procedure will be performed between 2:00- 3:30 PM the day before your surgery. If you are scheduled on a Monday, or a Tuesday following a Monday holiday, you will need to call on the last business day prior to your surgery.    [facility]  [address]  [place] [address]  [phone]    Please let your surgeon know if:      You develop any open sores, shingles, burning or  painful urination as these may increase your risk of an infection.   You no longer wish to have the surgery.   Any other personal circumstances change that may lead to the need to cancel or defer this surgery-such as being sick or getting admitted to any hospital within one week of your planned procedure.    Your contact details change, such as a change of address or phone number.    Starting now:     Please DO NOT drink alcohol or smoke for 24 hours before surgery. It is well known that quitting smoking can make a huge difference to your health and recovery from surgery. The longer you abstain from smoking, the better your chances of a healthy recovery. If you need help with quitting, call 6-520-QUIT-NOW to be connected to a trained counselor who will discuss the best methods to help you quit.     Before your surgery:    Please stop all supplements 7 days prior to surgery. Or as directed by your surgeon.   Please stop taking NSAID pain medicine such as Advil and Motrin 7 days before surgery.    If you develop any fever, cough, cold, rashes, cuts, scratches, scrapes, urinary symptoms or infection anywhere on your body (including teeth and gums) prior to surgery, please call your surgeon’s office as soon as possible. This may require treatment to reduce the chance of cancellation on the day of surgery.    The day before your surgery:   DIET- Do not eat any food after MIDNIGHT.   Get a good night’s rest.  Use the special soap for bathing if you have been instructed to use one.    Scheduled surgery times may change and you will be notified if this occurs - please check your personal voicemail for any updates.     On the morning of surgery:   Wear comfortable, loose fitting clothes which open in the front. Please do not wear moisturizers, creams, lotions, makeup or perfume.    Please bring with you to surgery:   Photo ID and insurance card   Current list of medicines and allergies   Pacemaker/ Defibrillator/Heart stent  cards   CPAP machine and mask    Slings/ splints/ crutches   A copy of your complete advanced directive/DHPOA.    Please do NOT bring with you to surgery:   All jewelry and valuables should be left at home.   Prosthetic devices such as contact lenses, hearing aids, dentures, eyelash extensions, hairpins and body piercings must be removed prior to going in to the surgical suite.    After outpatient surgery:   A responsible adult MUST accompany you at the time of discharge and stay with you for 24 hours after your surgery. You may NOT drive yourself home after surgery.    Do not drive, operate machinery, make critical decisions or do activities that require co-ordination or balance until after a night’s sleep.   Do not drink alcoholic beverages for 24 hours.   Instructions for resuming your medications will be provided by your surgeon.    CALL YOUR DOCTOR AFTER SURGERY IF YOU HAVE:     Chills and/or a fever of 101° F or higher.    Redness, swelling, pus or drainage from your surgical wound or a bad smell from the wound.    Lightheadedness, fainting or confusion.    Persistent vomiting (throwing up) and are not able to eat or drink for 12 hours.    Three or more loose, watery bowel movements in 24 hours (diarrhea).   Difficulty or pain while urinating( after non-urological surgery)    Pain and swelling in your legs, especially if it is only on one side.    Difficulty breathing or are breathing faster than normal.    Any new concerning symptoms.      Reviewed pre-op instructions with patient, states understanding and denies further questions at this time.    If you have not received a call regarding your arrival time for surgery by 2pm on the day before surgery, you can call [phone].    Take Care Hima!

## 2024-02-20 ENCOUNTER — ANESTHESIA EVENT (OUTPATIENT)
Dept: OPERATING ROOM | Facility: HOSPITAL | Age: 24
End: 2024-02-20
Payer: COMMERCIAL

## 2024-02-22 ENCOUNTER — HOSPITAL ENCOUNTER (OUTPATIENT)
Facility: HOSPITAL | Age: 24
Setting detail: OUTPATIENT SURGERY
Discharge: HOME | End: 2024-02-22
Attending: UROLOGY | Admitting: UROLOGY
Payer: COMMERCIAL

## 2024-02-22 ENCOUNTER — APPOINTMENT (OUTPATIENT)
Dept: RADIOLOGY | Facility: HOSPITAL | Age: 24
End: 2024-02-22
Payer: COMMERCIAL

## 2024-02-22 ENCOUNTER — ANESTHESIA (OUTPATIENT)
Dept: OPERATING ROOM | Facility: HOSPITAL | Age: 24
End: 2024-02-22
Payer: COMMERCIAL

## 2024-02-22 VITALS
BODY MASS INDEX: 27.62 KG/M2 | DIASTOLIC BLOOD PRESSURE: 86 MMHG | OXYGEN SATURATION: 97 % | SYSTOLIC BLOOD PRESSURE: 139 MMHG | RESPIRATION RATE: 16 BRPM | HEIGHT: 70 IN | TEMPERATURE: 97.2 F | HEART RATE: 75 BPM | WEIGHT: 192.9 LBS

## 2024-02-22 PROCEDURE — 7100000001 HC RECOVERY ROOM TIME - INITIAL BASE CHARGE: Performed by: UROLOGY

## 2024-02-22 PROCEDURE — 3700000001 HC GENERAL ANESTHESIA TIME - INITIAL BASE CHARGE: Performed by: UROLOGY

## 2024-02-22 PROCEDURE — 7100000002 HC RECOVERY ROOM TIME - EACH INCREMENTAL 1 MINUTE: Performed by: UROLOGY

## 2024-02-22 PROCEDURE — A50590 PR FRAGMENT KIDNEY STONE/ ESWL: Performed by: ANESTHESIOLOGIST ASSISTANT

## 2024-02-22 PROCEDURE — 2500000004 HC RX 250 GENERAL PHARMACY W/ HCPCS (ALT 636 FOR OP/ED): Performed by: UROLOGY

## 2024-02-22 PROCEDURE — 74018 RADEX ABDOMEN 1 VIEW: CPT

## 2024-02-22 PROCEDURE — A50590 PR FRAGMENT KIDNEY STONE/ ESWL: Performed by: STUDENT IN AN ORGANIZED HEALTH CARE EDUCATION/TRAINING PROGRAM

## 2024-02-22 PROCEDURE — 3600000003 HC OR TIME - INITIAL BASE CHARGE - PROCEDURE LEVEL THREE: Performed by: UROLOGY

## 2024-02-22 PROCEDURE — 3600000008 HC OR TIME - EACH INCREMENTAL 1 MINUTE - PROCEDURE LEVEL THREE: Performed by: UROLOGY

## 2024-02-22 PROCEDURE — 2500000005 HC RX 250 GENERAL PHARMACY W/O HCPCS: Performed by: ANESTHESIOLOGIST ASSISTANT

## 2024-02-22 PROCEDURE — 7100000010 HC PHASE TWO TIME - EACH INCREMENTAL 1 MINUTE: Performed by: UROLOGY

## 2024-02-22 PROCEDURE — 3700000002 HC GENERAL ANESTHESIA TIME - EACH INCREMENTAL 1 MINUTE: Performed by: UROLOGY

## 2024-02-22 PROCEDURE — 7100000009 HC PHASE TWO TIME - INITIAL BASE CHARGE: Performed by: UROLOGY

## 2024-02-22 PROCEDURE — 2500000004 HC RX 250 GENERAL PHARMACY W/ HCPCS (ALT 636 FOR OP/ED): Performed by: ANESTHESIOLOGIST ASSISTANT

## 2024-02-22 RX ORDER — HYDRALAZINE HYDROCHLORIDE 20 MG/ML
5 INJECTION INTRAMUSCULAR; INTRAVENOUS EVERY 30 MIN PRN
Status: DISCONTINUED | OUTPATIENT
Start: 2024-02-22 | End: 2024-02-22 | Stop reason: HOSPADM

## 2024-02-22 RX ORDER — PROPOFOL 10 MG/ML
INJECTION, EMULSION INTRAVENOUS AS NEEDED
Status: DISCONTINUED | OUTPATIENT
Start: 2024-02-22 | End: 2024-02-22

## 2024-02-22 RX ORDER — IPRATROPIUM BROMIDE 0.5 MG/2.5ML
500 SOLUTION RESPIRATORY (INHALATION) AS NEEDED
Status: DISCONTINUED | OUTPATIENT
Start: 2024-02-22 | End: 2024-02-22 | Stop reason: HOSPADM

## 2024-02-22 RX ORDER — LIDOCAINE HYDROCHLORIDE 10 MG/ML
INJECTION, SOLUTION EPIDURAL; INFILTRATION; INTRACAUDAL; PERINEURAL AS NEEDED
Status: DISCONTINUED | OUTPATIENT
Start: 2024-02-22 | End: 2024-02-22

## 2024-02-22 RX ORDER — SODIUM CHLORIDE, SODIUM LACTATE, POTASSIUM CHLORIDE, CALCIUM CHLORIDE 600; 310; 30; 20 MG/100ML; MG/100ML; MG/100ML; MG/100ML
100 INJECTION, SOLUTION INTRAVENOUS CONTINUOUS
Status: DISCONTINUED | OUTPATIENT
Start: 2024-02-22 | End: 2024-02-22 | Stop reason: HOSPADM

## 2024-02-22 RX ORDER — CEFAZOLIN SODIUM 2 G/100ML
2 INJECTION, SOLUTION INTRAVENOUS ONCE
Status: COMPLETED | OUTPATIENT
Start: 2024-02-22 | End: 2024-02-22

## 2024-02-22 RX ORDER — FENTANYL CITRATE 50 UG/ML
25 INJECTION, SOLUTION INTRAMUSCULAR; INTRAVENOUS EVERY 5 MIN PRN
Status: DISCONTINUED | OUTPATIENT
Start: 2024-02-22 | End: 2024-02-22 | Stop reason: HOSPADM

## 2024-02-22 RX ORDER — MEPERIDINE HYDROCHLORIDE 25 MG/ML
12.5 INJECTION INTRAMUSCULAR; INTRAVENOUS; SUBCUTANEOUS EVERY 10 MIN PRN
Status: DISCONTINUED | OUTPATIENT
Start: 2024-02-22 | End: 2024-02-22 | Stop reason: HOSPADM

## 2024-02-22 RX ORDER — LABETALOL HYDROCHLORIDE 5 MG/ML
5 INJECTION, SOLUTION INTRAVENOUS ONCE AS NEEDED
Status: DISCONTINUED | OUTPATIENT
Start: 2024-02-22 | End: 2024-02-22 | Stop reason: HOSPADM

## 2024-02-22 RX ORDER — ALBUTEROL SULFATE 0.83 MG/ML
2.5 SOLUTION RESPIRATORY (INHALATION) ONCE AS NEEDED
Status: DISCONTINUED | OUTPATIENT
Start: 2024-02-22 | End: 2024-02-22 | Stop reason: HOSPADM

## 2024-02-22 RX ORDER — DIPHENHYDRAMINE HYDROCHLORIDE 50 MG/ML
12.5 INJECTION INTRAMUSCULAR; INTRAVENOUS ONCE AS NEEDED
Status: DISCONTINUED | OUTPATIENT
Start: 2024-02-22 | End: 2024-02-22 | Stop reason: HOSPADM

## 2024-02-22 RX ORDER — ONDANSETRON HYDROCHLORIDE 2 MG/ML
INJECTION, SOLUTION INTRAVENOUS AS NEEDED
Status: DISCONTINUED | OUTPATIENT
Start: 2024-02-22 | End: 2024-02-22

## 2024-02-22 RX ORDER — PROCHLORPERAZINE EDISYLATE 5 MG/ML
5 INJECTION INTRAMUSCULAR; INTRAVENOUS ONCE AS NEEDED
Status: DISCONTINUED | OUTPATIENT
Start: 2024-02-22 | End: 2024-02-22 | Stop reason: HOSPADM

## 2024-02-22 RX ORDER — FENTANYL CITRATE 50 UG/ML
50 INJECTION, SOLUTION INTRAMUSCULAR; INTRAVENOUS EVERY 5 MIN PRN
Status: DISCONTINUED | OUTPATIENT
Start: 2024-02-22 | End: 2024-02-22 | Stop reason: HOSPADM

## 2024-02-22 RX ORDER — FENTANYL CITRATE 50 UG/ML
INJECTION, SOLUTION INTRAMUSCULAR; INTRAVENOUS AS NEEDED
Status: DISCONTINUED | OUTPATIENT
Start: 2024-02-22 | End: 2024-02-22

## 2024-02-22 RX ORDER — ONDANSETRON HYDROCHLORIDE 2 MG/ML
4 INJECTION, SOLUTION INTRAVENOUS ONCE AS NEEDED
Status: DISCONTINUED | OUTPATIENT
Start: 2024-02-22 | End: 2024-02-22 | Stop reason: HOSPADM

## 2024-02-22 RX ORDER — DEXAMETHASONE SODIUM PHOSPHATE 4 MG/ML
INJECTION, SOLUTION INTRA-ARTICULAR; INTRALESIONAL; INTRAMUSCULAR; INTRAVENOUS; SOFT TISSUE AS NEEDED
Status: DISCONTINUED | OUTPATIENT
Start: 2024-02-22 | End: 2024-02-22

## 2024-02-22 RX ORDER — MIDAZOLAM HYDROCHLORIDE 1 MG/ML
INJECTION, SOLUTION INTRAMUSCULAR; INTRAVENOUS AS NEEDED
Status: DISCONTINUED | OUTPATIENT
Start: 2024-02-22 | End: 2024-02-22

## 2024-02-22 RX ADMIN — DEXAMETHASONE SODIUM PHOSPHATE 4 MG: 4 INJECTION, SOLUTION INTRAMUSCULAR; INTRAVENOUS at 14:10

## 2024-02-22 RX ADMIN — MIDAZOLAM 2 MG: 1 INJECTION INTRAMUSCULAR; INTRAVENOUS at 13:56

## 2024-02-22 RX ADMIN — PROPOFOL 200 MG: 10 INJECTION, EMULSION INTRAVENOUS at 14:01

## 2024-02-22 RX ADMIN — FENTANYL CITRATE 25 MCG: 50 INJECTION INTRAMUSCULAR; INTRAVENOUS at 14:15

## 2024-02-22 RX ADMIN — LIDOCAINE HYDROCHLORIDE 5 ML: 10 INJECTION, SOLUTION EPIDURAL; INFILTRATION; INTRACAUDAL; PERINEURAL at 14:01

## 2024-02-22 RX ADMIN — SODIUM CHLORIDE, SODIUM LACTATE, POTASSIUM CHLORIDE, AND CALCIUM CHLORIDE 100 ML/HR: 600; 310; 30; 20 INJECTION, SOLUTION INTRAVENOUS at 13:46

## 2024-02-22 RX ADMIN — FENTANYL CITRATE 25 MCG: 50 INJECTION INTRAMUSCULAR; INTRAVENOUS at 14:36

## 2024-02-22 RX ADMIN — ONDANSETRON 4 MG: 2 INJECTION INTRAMUSCULAR; INTRAVENOUS at 14:10

## 2024-02-22 RX ADMIN — SODIUM CHLORIDE, SODIUM LACTATE, POTASSIUM CHLORIDE, AND CALCIUM CHLORIDE: 600; 310; 30; 20 INJECTION, SOLUTION INTRAVENOUS at 14:36

## 2024-02-22 RX ADMIN — FENTANYL CITRATE 50 MCG: 50 INJECTION INTRAMUSCULAR; INTRAVENOUS at 14:01

## 2024-02-22 RX ADMIN — CEFAZOLIN SODIUM 2 G: 2 INJECTION, SOLUTION INTRAVENOUS at 14:01

## 2024-02-22 SDOH — HEALTH STABILITY: MENTAL HEALTH: CURRENT SMOKER: 0

## 2024-02-22 ASSESSMENT — PAIN - FUNCTIONAL ASSESSMENT
PAIN_FUNCTIONAL_ASSESSMENT: 0-10

## 2024-02-22 ASSESSMENT — PAIN SCALES - GENERAL
PAINLEVEL_OUTOF10: 0 - NO PAIN
PAINLEVEL_OUTOF10: 1

## 2024-02-22 ASSESSMENT — COLUMBIA-SUICIDE SEVERITY RATING SCALE - C-SSRS
6. HAVE YOU EVER DONE ANYTHING, STARTED TO DO ANYTHING, OR PREPARED TO DO ANYTHING TO END YOUR LIFE?: NO
1. IN THE PAST MONTH, HAVE YOU WISHED YOU WERE DEAD OR WISHED YOU COULD GO TO SLEEP AND NOT WAKE UP?: NO
2. HAVE YOU ACTUALLY HAD ANY THOUGHTS OF KILLING YOURSELF?: NO

## 2024-02-22 ASSESSMENT — PAIN DESCRIPTION - DESCRIPTORS: DESCRIPTORS: ACHING

## 2024-02-22 NOTE — DISCHARGE INSTRUCTIONS
Expect blood in the urine  Expect a bruise in the right flank  Expect frequent urination  Use Tylenol for pain

## 2024-02-22 NOTE — ANESTHESIA PROCEDURE NOTES
Airway  Date/Time: 2/22/2024 2:03 PM  Urgency: elective      Staffing  Performed: DIANELYS   Authorized by: Epi Flood MD    Performed by: DIANELYS Roth  Patient location during procedure: OR    Indications and Patient Condition  Indications for airway management: anesthesia  Spontaneous Ventilation: absent  Sedation level: deep  Preoxygenated: yes  Mask difficulty assessment: 1 - vent by mask    Final Airway Details  Final airway type: supraglottic airway      Successful airway: Size 4     Number of attempts at approach: 1

## 2024-02-22 NOTE — OP NOTE
Lithotripsy Extracorporeal Shock Wave (ESWL REP JOSE R LUCERO [phone]) (R) Operative Note     Date: 2024  OR Location: [facility]    Name: Hiam Mesa, : 2000, Age: 23 y.o., MRN: [id number], Sex: male    Diagnosis  Pre-op Diagnosis     * Kidney stone [N20.0] Post-op Diagnosis     * Kidney stone [N20.0]     Procedures  Lithotripsy Extracorporeal Shock Wave (ESWL REP JOSE R LUCERO [phone])  13479 - IN LITHOTRIPSY XTRCORP SHOCK WAVE      Surgeons      * Pantera Ahmadi - Primary    Resident/Fellow/Other Assistant:  Surgeon(s) and Role:    Procedure Summary  Anesthesia: General  ASA: I  Anesthesia Staff: Anesthesiologist: Epi Flood MD  C-AA: DIANELYS Gomez; DIANELYS Roth  Estimated Blood Loss: 0 mL  Intra-op Medications:   Administrations occurring from 1335 to 1435 on 24:   Medication Name Total Dose   lactated Ringer's infusion 81.67 mL   ceFAZolin in dextrose (iso-os) (Ancef) IVPB 2 g 2 g              Anesthesia Record               Intraprocedure I/O Totals       None           Specimen: No specimens collected     Staff:   Circulator: Susana Ramos RN         Drains and/or Catheters: * None in log *    Tourniquet Times:         Implants:     Findings: Stone in the right lower pole and right proximal ureter    Indications: Hima Mesa is an 23 y.o. male who is having surgery for RT KIDNEY STONE N20.0.     The patient was seen in the preoperative area. The risks, benefits, complications, treatment options, non-operative alternatives, expected recovery and outcomes were discussed with the patient. The possibilities of reaction to medication, pulmonary aspiration, injury to surrounding structures, bleeding, recurrent infection, the need for additional procedures, failure to diagnose a condition, and creating a complication requiring transfusion or operation were discussed with the patient. The patient concurred with the proposed plan, giving informed consent.   The site of surgery was properly noted/marked if necessary per policy. The patient has been actively warmed in preoperative area. Preoperative antibiotics have been ordered and given within 1 hours of incision. Venous thrombosis prophylaxis are not indicated.    Procedure Details: Patient was taken the operating room placed under general anesthesia.  He was placed in the appropriate position on the ESWL table.  This 2 stones were seen.  Shockwaves were delivered to both of them.  It was difficult to tell if there was adequate fragmentation but there appeared to be some fragmentation.  At the conclusion of the procedure the patient was awakened and taken to the recovery room in stable condition.  Complications:  None; patient tolerated the procedure well.    Disposition: PACU - hemodynamically stable.  Condition: stable         Additional Details:       Attending Attestation: I performed the procedure.    Pantera Ahmadi  Phone Number: [phone]

## 2024-02-22 NOTE — ANESTHESIA PREPROCEDURE EVALUATION
Patient: Hima Mesa    Procedure Information       Date/Time: 02/22/24 1975    Procedure: Lithotripsy Extracorporeal Shock Wave (ESWL REP JOSE R LUCERO [phone]) (Right)    Location: [facility] 01 / Virtual [facility]    Surgeons: Pantera Ahmadi MD            Relevant Problems   Anesthesia (within normal limits)      Cardiovascular   (-) History of coronary artery bypass graft   (-) Pacemaker      Endocrine   (-) Diabetes mellitus, type 2 (CMS/HCC)      /Renal   (+) Kidney stone on right side      Neuro/Psych   (+) Anxiety   (-) CVA (cerebral vascular accident) (CMS/HCC)   (-) Seizures (CMS/HCC)      Pulmonary   (-) Asthma   (-) Chronic obstructive pulmonary disease (CMS/[facility])   (-) FATOU (obstructive sleep apnea)      Hematology   (-) Coagulopathy (CMS/[facility])       Clinical information reviewed:   Tobacco  Allergies  Meds   Med Hx  Surg Hx   Fam Hx  Soc Hx        NPO Detail:  NPO/Void Status  Date of Last Liquid: 02/21/24  Time of Last Liquid: 0001  Date of Last Solid: 02/21/24  Time of Last Solid: 0001  Time of Last Void: 1154         Physical Exam    Airway  Mallampati: I  TM distance: >3 FB  Neck ROM: full     Cardiovascular    Dental    Pulmonary    Abdominal            Anesthesia Plan    History of general anesthesia?: yes  History of complications of general anesthesia?: no    ASA 1     general     The patient is not a current smoker.    intravenous induction   Postoperative administration of opioids is intended.  Anesthetic plan and risks discussed with patient.  Use of blood products discussed with patient who consented to blood products.

## 2024-02-22 NOTE — ANESTHESIA POSTPROCEDURE EVALUATION
Patient: Hima Mesa    Procedure Summary       Date: 02/22/24 Room / Location: [facility] / [facility]    Anesthesia Start: 1358 Anesthesia Stop: 1449    Procedure: Lithotripsy Extracorporeal Shock Wave (ESWL REP JOSE R LUCERO [phone]) (Right) Diagnosis:       Kidney stone      (RT KIDNEY STONE N20.0)    Surgeons: Pantera Ahmadi MD Responsible Provider: Epi Flood MD    Anesthesia Type: general ASA Status: 1            Anesthesia Type: general    Vitals Value Taken Time   /98 02/22/24 1445   Temp 36 °C (96.8 °F) 02/22/24 1445   Pulse 72 02/22/24 1445   Resp 18 02/22/24 1445   SpO2 99 % 02/22/24 1445       Anesthesia Post Evaluation    Patient location during evaluation: PACU  Patient participation: complete - patient participated  Level of consciousness: awake  Pain management: adequate  Multimodal analgesia pain management approach  Airway patency: patent  Cardiovascular status: acceptable and hemodynamically stable  Respiratory status: acceptable and spontaneous ventilation  Hydration status: euvolemic  Postoperative Nausea and Vomiting: none  Comments: No nausea or vomiting        There were no known notable events for this encounter.

## 2024-03-08 ENCOUNTER — TELEMEDICINE (OUTPATIENT)
Dept: BEHAVIORAL HEALTH | Facility: CLINIC | Age: 24
End: 2024-03-08
Payer: COMMERCIAL

## 2024-03-08 DIAGNOSIS — F41.9 ANXIETY: ICD-10-CM

## 2024-03-08 DIAGNOSIS — F15.20: ICD-10-CM

## 2024-03-08 DIAGNOSIS — F90.2 ADHD (ATTENTION DEFICIT HYPERACTIVITY DISORDER), COMBINED TYPE: ICD-10-CM

## 2024-03-08 DIAGNOSIS — F10.20 ALCOHOL USE DISORDER, MODERATE, DEPENDENCE (MULTI): ICD-10-CM

## 2024-03-08 DIAGNOSIS — F12.20 CANNABIS USE DISORDER, SEVERE, DEPENDENCE (MULTI): ICD-10-CM

## 2024-03-08 PROCEDURE — 99214 OFFICE O/P EST MOD 30 MIN: CPT | Performed by: PSYCHIATRY & NEUROLOGY

## 2024-03-08 PROCEDURE — 90833 PSYTX W PT W E/M 30 MIN: CPT | Performed by: PSYCHIATRY & NEUROLOGY

## 2024-03-08 RX ORDER — TRAZODONE HYDROCHLORIDE 100 MG/1
100 TABLET ORAL NIGHTLY
Qty: 30 TABLET | Refills: 2 | Status: SHIPPED | OUTPATIENT
Start: 2024-03-08 | End: 2024-04-02

## 2024-03-08 NOTE — PROGRESS NOTES
"Subjective    All Individuals Present: Patient and Provider (Encounter Provider)     ID: Hima Mesa is a 23 y.o. 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 He has been working about 30 hours a week, spending money on rent and saving for insurance.    He continues to go to meeting.    Pt got a good report from his PO recently.    He wants to go back to [facility] in Spring 2025.    On ROS, he has been enjoying watching econ videos. He continues to work. He is future-oriented to going back to college. Mood \"good.\" Denies feeling down, depressed, worried, or anxious. Appetite \"nothing out of the ordinary.\" Sleep \"good,\" finding trazodone helpful and taking it nightly. Energy \"about normal.\" No SI/HI/AVH.     Medication side effects: None Reported     Review of Systems  See HPI.    Objective   There were no vitals taken for this visit.  Wt Readings from Last 4 Encounters:   02/22/24 87.5 kg (192 lb 14.4 oz)   01/29/24 91.6 kg (201 lb 15.1 oz)   10/11/23 80.5 kg (177 lb 8 oz)   04/10/23 71.3 kg (157 lb 3.2 oz)       Mental Status Exam  General Appearance: Well groomed, appropriate eye contact.  Attitude/Behavior: Cooperative, conversant, engaged, and with good eye contact.  Motor: No psychomotor agitation or retardation, no tremor or other abnormal movements.  Speech: Normal rate, volume, prosody  Gait/Station: Within normal limits  Mood: \"pretty good\".  Affect: Euthymic, full-range  Thought Process: Linear, goal directed  Thought Associations: No loosening of associations  Thought Content: normal  Sensorium: Alert and oriented to person, place, time and situation  Insight:  intact  Judgment: Intact  Cognition: Cognitively intact to conversational testing with respect to attention, orientation, fund of " knowledge, recent and remote memory, and language.  Testing: N/A.    Laboratory/Imaging/Diagnostic Tests   N/A    Assessment/Plan   Overall Formulation and Differential Diagnosis:  Hima Mesa is a 23 y.o. male who meets criteria for ADHD, depression, anxiety and substance use   Interval Assessment:  Pt doing well overall and hoping to return to college next year.  Risk Assessment:  Imminent Risk of Suicide or Serious Self-Injury: Low Risk -- Risk factors include: Age, Gender, and History of alcohol or other substance use disorder  Protective factors include:Denies current suicidal ideation, Denies history of suicide attempts , Future-oriented talk , Willingness to seek help and support , Skills in problem solving, conflict resolution, and nonviolent handling of disputes, Cultural and Mandaen beliefs that discourage suicide and support self-preservation , Access to a variety of clinical interventions , Receiving and engaged in care for mental, physical, and substance use disorders , History of adhering to treatment recommendations and/or prescribed medication regimen , Support through ongoing medical and mental healthcare relationships , Current/history of good response to treatment/meds , and Interpersonal relationships and supports, e.g., family, friends, peers, community   Imminent Risk of Violence or Homicide: Low Risk - Risk factors include: Current/history of alcohol or other substance abuse, especially PCP or stimulants  and Gender. Protective factors include: Lack of known history of harm to others , Lack of known history of violent ideation , Lack of known access to firearms , Sense of community, availability/access to resources and support , Sense of optimism, hope , Interpersonal competence , Affect regulation , Sense of self-efficacy, internal locus of control , and Positive, pro-social family/peer network   Treatment Plan:  There are no recently modified care plans to display for this  patient.    -continue Trazodone 50-100mg PO at bedtime    Attestation Statements   Number of minutes spent performing psychotherapy: 17min and Psychotherapy Modality: Cognitive Behavioral Therapy

## 2024-03-25 ENCOUNTER — TELEPHONE (OUTPATIENT)
Dept: PEDIATRICS | Facility: CLINIC | Age: 24
End: 2024-03-25
Payer: COMMERCIAL

## 2024-03-25 NOTE — TELEPHONE ENCOUNTER
Mom called this AM for update and questions. (PHI in chart) He was just recently seen in the ER for his 7th kidney stone.  He has been seeing Pantera Ahmadi (adult urology) who they like but are wondering if they should get a second opinion.  I asked mom about work up and she doesn't know if any stones have been sent to the lab.  He has not had a 24 hour urine and the only dietary guidance so far has been to decrease carbonated beverages.  I would recommend Frank Martin in the  system.     The other issue is that he recently had to have to have a dental implant and wisdom teeth extraction.  He was worried about the alcohol percentage of the mouth wash prescribed for before the surgery as he is tested at his [facility].  He ultimately got an infection of one of the wisdom teeth sites and was treated with abx.  It got better but then started his cheek started to swell again in the last few days.  He has an appointment with the oral surgeon tomorrow.      His parents have asked him to call me to go over these issues.

## 2024-05-06 ENCOUNTER — OFFICE VISIT (OUTPATIENT)
Dept: UROLOGY | Facility: HOSPITAL | Age: 24
End: 2024-05-06
Payer: COMMERCIAL

## 2024-05-06 DIAGNOSIS — N20.0 KIDNEY STONES, CALCIUM OXALATE: Primary | ICD-10-CM

## 2024-05-06 PROCEDURE — 99204 OFFICE O/P NEW MOD 45 MIN: CPT | Performed by: UROLOGY

## 2024-05-06 PROCEDURE — 99214 OFFICE O/P EST MOD 30 MIN: CPT | Performed by: UROLOGY

## 2024-05-06 NOTE — PROGRESS NOTES
NAME:Hima Mesa  DATE: 5/6/2024               Subjective:   Chief complaint: Kidney stones    HPI:  23 y.o. male presenting for evaluation of kidney stones     Has seen Dr. Ahmadi in the past for stones.  Has ESWL on 2/22/24    KUB 4/4/24 - cluster of stone in distal right ureter.      Stones calcium oxalate    Has passed multiple stones over the past few years.      Currently asymptomatic    AUA SS 7, QOL 5  JORGE 19    Past Medical History:   Diagnosis Date    ADHD     Anxiety     Classic migraine with aura 04/10/2023    H/O renal calculi     Infectious mononucleosis 02/25/2019    Staph skin infection 04/10/2023    Unspecified papilledema 06/18/2015    Optic nerve edema     Past Surgical History:   Procedure Laterality Date    EXTRACORPOREAL SHOCK WAVE LITHOTRIPSY Left 05/04/2023    EXTRACORPOREAL SHOCK WAVE LITHOTRIPSY Right 04/13/2023     Social History     Tobacco Use    Smoking status: Some Days     Current packs/day: 0.50     Average packs/day: 0.5 packs/day for 0.5 years (0.3 ttl pk-yrs)     Types: Cigarettes     Passive exposure: Never    Smokeless tobacco: Never    Tobacco comments:     Pt doesn't have desire to quit smoking at this time    Substance Use Topics    Alcohol use: Not Currently     Family History   Problem Relation Name Age of Onset    No Known Problems Mother Faby     Hypertension Father Jason     Anxiety disorder Brother Daren     No Known Problems Brother Moshe     Macular degeneration Maternal Great-Grandmother       Current Outpatient Medications on File Prior to Visit   Medication Sig Dispense Refill    traZODone (Desyrel) 100 mg tablet TAKE 1 TABLET BY MOUTH EVERY DAY AT BEDTIME 90 tablet 0     No current facility-administered medications on file prior to visit.     Hima is allergic to cat dander.     Review of Systems    14 point ROS reviewed and discussed with the patient. Pertinent positives/negatives discussed in the History of Present Illness (HPI).    Objective:      Physical Exam   1. Constitutional: NAD, Well-developed, Well-nourished  2. Respiratory: Unlabored breathing, no audible wheezes, no use of accessory muscles   3. Cardiovascular: No JVD, extremities perfused, no edema  4. Abdomen: Soft, non-tender, non-distended, no masses or hernia.  No CVA tenderness.    5. Skin: no visible lesions, plaque, rashes, jaundice  6. Neuro: Gait normal, no focal neurologic deficit  7. Psychiatric: Mood and affect normal and appropriate, alert and oriented    Labs  Lab Results   Component Value Date    GFRMALE >90 04/10/2023     Lab Results   Component Value Date    CREATININE 1.27 01/29/2024     Lab Results   Component Value Date    HCT 40.3 (L) 01/29/2024       Assessment/Plan:   Hima Mesa presents with     1. Kidney stones, calcium oxalate      Recurrent stone former  Currently asymptomatic  Stone prevention discussed  Will send for 24hr  Referral to nephrology for medical management of stone disease

## 2024-05-09 ENCOUNTER — OFFICE VISIT (OUTPATIENT)
Dept: NEPHROLOGY | Facility: CLINIC | Age: 24
End: 2024-05-09
Payer: COMMERCIAL

## 2024-05-09 VITALS
SYSTOLIC BLOOD PRESSURE: 99 MMHG | DIASTOLIC BLOOD PRESSURE: 69 MMHG | BODY MASS INDEX: 27.35 KG/M2 | WEIGHT: 191 LBS | HEART RATE: 62 BPM | HEIGHT: 70 IN

## 2024-05-09 DIAGNOSIS — E87.20 ACIDOSIS, METABOLIC: ICD-10-CM

## 2024-05-09 DIAGNOSIS — E87.6 HYPOKALEMIA: ICD-10-CM

## 2024-05-09 DIAGNOSIS — N20.0 KIDNEY STONES, CALCIUM OXALATE: Primary | ICD-10-CM

## 2024-05-09 PROCEDURE — 99204 OFFICE O/P NEW MOD 45 MIN: CPT | Performed by: INTERNAL MEDICINE

## 2024-05-09 NOTE — PROGRESS NOTES
Patient ID: Hima Mesa is a 23 y.o. male who is seen in nephrology clinic for calcium oxalate nephrolithiasis.  Pt reports first developed stones last April.  Since that time has had lithotripsy x 2 and spontaneously passed stones on three other occasions.      Review of blood work and urine studies shows hypokalemia and intermittent non gap metabolic acidosis.  Urine pH at times on the high side (6 and 8) but also ability acidify down to pH 5 as of 1/2023.  There is no hypercalcemia.        Pt reports 5-6 bowel movements per day.  Denies any hematochezia or melena. Denies excessive ingestion of soy or tofu products, star fruit.  Does not restrict salt from his diet.  Denies licorice or chewing tobacco.      No joint pains, abdominal pain, fevers, weight loss.  No vison changes or rashes.     Patient Active Problem List   Diagnosis    ADHD (attention deficit hyperactivity disorder), combined type    Alcohol use disorder, moderate, dependence (Multi)    Amphetamine use disorder, moderate (Multi)    Anisocoria    Anxiety    Cannabis use disorder, severe, dependence (Multi)    Disorder of optic nerve    Myopia of right eye    Optic nerve drusen    Kidney stones, calcium oxalate    Calculus in urethra    Right ureteral stone        Family History   Problem Relation Name Age of Onset    No Known Problems Mother Faby     Hypertension Father Jason     Anxiety disorder Brother Daren     No Known Problems Brother Moshe     Macular degeneration Maternal Great-Grandmother         Social History     Tobacco Use    Smoking status: Some Days     Current packs/day: 0.50     Average packs/day: 0.5 packs/day for 0.5 years (0.3 ttl pk-yrs)     Types: Cigarettes     Passive exposure: Never    Smokeless tobacco: Never    Tobacco comments:     Pt doesn't have desire to quit smoking at this time    Substance Use Topics    Alcohol use: Not Currently             Current Outpatient Medications:     traZODone (Desyrel) 100 mg tablet,  "TAKE 1 TABLET BY MOUTH EVERY DAY AT BEDTIME, Disp: 90 tablet, Rfl: 0       Vitals:    05/09/24 1031   BP: 99/69   Pulse: 62        Physical Exam  Gen alert, pleasant  Heart RRR, no murmurs or rubs  Lungs clear bilaterally  Abd soft, no bruits noted  Ext no lower extremity edema bilaterally, normal pulses  Skin no bruises or rashes on visible skin surfaces  Neuro no asterixis, no focal deficits  Psych normal affect      Lab Results   Component Value Date    WBC 15.0 (H) 01/29/2024    HGB 14.8 01/29/2024    HCT 40.3 (L) 01/29/2024    MCV 87 01/29/2024     01/29/2024      Lab Results   Component Value Date    GLUCOSE 104 (H) 01/29/2024    CALCIUM 9.4 01/29/2024     01/29/2024    K 3.4 (L) 01/29/2024    CO2 23 01/29/2024     01/29/2024    BUN 22 01/29/2024    CREATININE 1.27 01/29/2024      No results found for: \"MICROALBCREA\"     No results found for: \"PTH\", \"UTPCR\"             Assessment/Plan   Nephrolithiasis  -  calcium oxalate stones.  Will send a 24 hour stone risk profile    However, there is hypokalemia, acidemia intermittently and pt reported 5-6 bowel movements daily. Concerning for an inflammatory bowel disease wich can certain associate with calcium oxalate stones. Recommend a GI evaluation.      If hypokalemia is the primary culprit, acidemia would be a bit unusual. Typically hypokalemia associates with alkalemia and hypocitraturia, which is the associating factor for nephrolithiasis.  There is no hypertension, no hypomagnesemia, culprit medications, or family history of salt wasting nephropathy.     If the acidemia is primary, it is not persistent, and urinary acidfication appears intact.      Will obtain updated blood work and urine studies on the same date pt returns 24 hour urine collection.  Will include a pth, renal panel, lft's, vit d, b12, iron studies, ferritin and crp.      Will send urine potassium and creatinine levels and repeat urinalysis.        Addendum 6/10/24    Labs " reviewed.  Renal function normal and k has normalized.  24 hour urine stone risk analysis reviewed.  There is hypercalciuria.  Pth was wnl.  Recommend to start potassium citrate 10 meq bid for now.  Would avoid hydrochlorothiazide given hx of hypokalemia. Encourage workup for chronic diarrhea.       Attempted to contact mr moses 10-15 times over the past two weeks but no answer on his listed phone numbers and no call backs.

## 2024-05-13 ENCOUNTER — TELEPHONE (OUTPATIENT)
Dept: PEDIATRICS | Facility: CLINIC | Age: 24
End: 2024-05-13
Payer: COMMERCIAL

## 2024-05-13 PROBLEM — B27.90 MONONUCLEOSIS: Status: RESOLVED | Noted: 2019-02-25 | Resolved: 2024-05-13

## 2024-05-13 NOTE — TELEPHONE ENCOUNTER
Mom called this AM to go over a few areas of concern:  1) saw urology and nephrology and the latter is concerned about his hypokalemia and its cause.  Would like the chronic frequent stooling to be addressed.  Qid for as long as he can remember.  Mom said he was worked up for a GI issue at some point.  Found reference to it in chart but not in recent [place].  10/2026.  2) His oral infection is back and the oral surgeon would like a CT.  He saw ID in 2018 due to recurrent skin staph infections.  This decreased oral abx use.  Compresses, drainage, before any oral abx.    3) in review of chart found that 9/2022 was the first note in regards to blood in urine.      He should make an appointment with me to get the GI eval started.  If the oral surgeon can tell me exactly what CT scan to order I can do it if dentist can't.

## 2024-05-28 ENCOUNTER — LAB (OUTPATIENT)
Dept: LAB | Facility: LAB | Age: 24
End: 2024-05-28
Payer: COMMERCIAL

## 2024-05-28 DIAGNOSIS — E87.6 HYPOKALEMIA: ICD-10-CM

## 2024-05-28 DIAGNOSIS — N20.0 KIDNEY STONES, CALCIUM OXALATE: ICD-10-CM

## 2024-05-28 DIAGNOSIS — E87.20 ACIDOSIS, METABOLIC: ICD-10-CM

## 2024-05-28 DIAGNOSIS — Z13.220 LIPID SCREENING: ICD-10-CM

## 2024-05-28 LAB
25(OH)D3 SERPL-MCNC: 23 NG/ML (ref 30–100)
ALBUMIN SERPL BCP-MCNC: 4.6 G/DL (ref 3.4–5)
ALP SERPL-CCNC: 64 U/L (ref 33–120)
ALT SERPL W P-5'-P-CCNC: 28 U/L (ref 10–52)
ANION GAP SERPL CALC-SCNC: 16 MMOL/L (ref 10–20)
APPEARANCE UR: CLEAR
AST SERPL W P-5'-P-CCNC: 17 U/L (ref 9–39)
BILIRUB SERPL-MCNC: 0.5 MG/DL (ref 0–1.2)
BILIRUB UR STRIP.AUTO-MCNC: NEGATIVE MG/DL
BUN SERPL-MCNC: 20 MG/DL (ref 6–23)
CALCIUM SERPL-MCNC: 8.9 MG/DL (ref 8.6–10.3)
CHLORIDE SERPL-SCNC: 103 MMOL/L (ref 98–107)
CHOLEST SERPL-MCNC: 155 MG/DL (ref 0–199)
CHOLESTEROL/HDL RATIO: 3.6
CO2 SERPL-SCNC: 22 MMOL/L (ref 21–32)
COLOR UR: NORMAL
CREAT SERPL-MCNC: 0.73 MG/DL (ref 0.5–1.3)
CREAT UR-MCNC: 176.8 MG/DL (ref 20–370)
CRP SERPL-MCNC: 0.49 MG/DL
EGFRCR SERPLBLD CKD-EPI 2021: >90 ML/MIN/1.73M*2
ERYTHROCYTE [DISTWIDTH] IN BLOOD BY AUTOMATED COUNT: 12.2 % (ref 11.5–14.5)
ERYTHROCYTE [SEDIMENTATION RATE] IN BLOOD BY WESTERGREN METHOD: 13 MM/H (ref 0–15)
FERRITIN SERPL-MCNC: 138 NG/ML (ref 20–300)
GLUCOSE SERPL-MCNC: 94 MG/DL (ref 74–99)
GLUCOSE UR STRIP.AUTO-MCNC: NORMAL MG/DL
HCT VFR BLD AUTO: 43.9 % (ref 41–52)
HDLC SERPL-MCNC: 42.8 MG/DL
HGB BLD-MCNC: 15.6 G/DL (ref 13.5–17.5)
IRON SATN MFR SERPL: 34 % (ref 25–45)
IRON SERPL-MCNC: 104 UG/DL (ref 35–150)
KETONES UR STRIP.AUTO-MCNC: NEGATIVE MG/DL
LDLC SERPL CALC-MCNC: 93 MG/DL
LEUKOCYTE ESTERASE UR QL STRIP.AUTO: NEGATIVE
MAGNESIUM SERPL-MCNC: 1.8 MG/DL (ref 1.6–2.4)
MCH RBC QN AUTO: 31.3 PG (ref 26–34)
MCHC RBC AUTO-ENTMCNC: 35.5 G/DL (ref 32–36)
MCV RBC AUTO: 88 FL (ref 80–100)
NITRITE UR QL STRIP.AUTO: NEGATIVE
NON HDL CHOLESTEROL: 112 MG/DL (ref 0–149)
NRBC BLD-RTO: 0 /100 WBCS (ref 0–0)
PH UR STRIP.AUTO: 5 [PH]
PLATELET # BLD AUTO: 314 X10*3/UL (ref 150–450)
POTASSIUM SERPL-SCNC: 4.1 MMOL/L (ref 3.5–5.3)
POTASSIUM UR-SCNC: 63 MMOL/L
POTASSIUM/CREAT UR-RTO: 36 MMOL/G CREAT
PROT SERPL-MCNC: 7 G/DL (ref 6.4–8.2)
PROT UR STRIP.AUTO-MCNC: NEGATIVE MG/DL
PTH-INTACT SERPL-MCNC: 31.4 PG/ML (ref 18.5–88)
RBC # BLD AUTO: 4.99 X10*6/UL (ref 4.5–5.9)
RBC # UR STRIP.AUTO: NEGATIVE /UL
SODIUM SERPL-SCNC: 137 MMOL/L (ref 136–145)
SP GR UR STRIP.AUTO: 1.03
TIBC SERPL-MCNC: 306 UG/DL (ref 240–445)
TRIGL SERPL-MCNC: 95 MG/DL (ref 0–149)
UIBC SERPL-MCNC: 202 UG/DL (ref 110–370)
UROBILINOGEN UR STRIP.AUTO-MCNC: NORMAL MG/DL
VLDL: 19 MG/DL (ref 0–40)
WBC # BLD AUTO: 6.6 X10*3/UL (ref 4.4–11.3)

## 2024-05-28 PROCEDURE — 36415 COLL VENOUS BLD VENIPUNCTURE: CPT

## 2024-05-28 PROCEDURE — 82436 ASSAY OF URINE CHLORIDE: CPT

## 2024-05-28 PROCEDURE — 81003 URINALYSIS AUTO W/O SCOPE: CPT

## 2024-05-28 PROCEDURE — 84392 ASSAY OF URINE SULFATE: CPT

## 2024-05-28 PROCEDURE — 83935 ASSAY OF URINE OSMOLALITY: CPT

## 2024-05-28 PROCEDURE — 85027 COMPLETE CBC AUTOMATED: CPT

## 2024-05-28 PROCEDURE — 84133 ASSAY OF URINE POTASSIUM: CPT

## 2024-05-28 PROCEDURE — 84560 ASSAY OF URINE/URIC ACID: CPT

## 2024-05-28 PROCEDURE — 83550 IRON BINDING TEST: CPT

## 2024-05-28 PROCEDURE — 83735 ASSAY OF MAGNESIUM: CPT

## 2024-05-28 PROCEDURE — 83945 ASSAY OF OXALATE: CPT

## 2024-05-28 PROCEDURE — 83970 ASSAY OF PARATHORMONE: CPT

## 2024-05-28 PROCEDURE — 85652 RBC SED RATE AUTOMATED: CPT

## 2024-05-28 PROCEDURE — 82340 ASSAY OF CALCIUM IN URINE: CPT

## 2024-05-28 PROCEDURE — 82728 ASSAY OF FERRITIN: CPT

## 2024-05-28 PROCEDURE — 82306 VITAMIN D 25 HYDROXY: CPT

## 2024-05-28 PROCEDURE — 86038 ANTINUCLEAR ANTIBODIES: CPT

## 2024-05-28 PROCEDURE — 84300 ASSAY OF URINE SODIUM: CPT

## 2024-05-28 PROCEDURE — 82507 ASSAY OF CITRATE: CPT

## 2024-05-28 PROCEDURE — 82570 ASSAY OF URINE CREATININE: CPT

## 2024-05-28 PROCEDURE — 80053 COMPREHEN METABOLIC PANEL: CPT

## 2024-05-28 PROCEDURE — 86140 C-REACTIVE PROTEIN: CPT

## 2024-05-28 PROCEDURE — 83540 ASSAY OF IRON: CPT

## 2024-05-28 PROCEDURE — 83986 ASSAY PH BODY FLUID NOS: CPT

## 2024-05-28 PROCEDURE — 80061 LIPID PANEL: CPT

## 2024-05-28 PROCEDURE — 84540 ASSAY OF URINE/UREA-N: CPT

## 2024-05-28 PROCEDURE — 82140 ASSAY OF AMMONIA: CPT

## 2024-05-30 LAB — ANA SER QL HEP2 SUBST: NEGATIVE

## 2024-06-01 LAB
AMMONIA 24H UR-SRATE: 49 MMOL/24 H (ref 15–56)
BRUSHITE CRYSTAL(MAYO): 0.54 DG
BSA: ABNORMAL 1.73M(2)
CALCIUM 24H UR-MRATE: 384 MG/24 H
CAOX 24H ENGDIFF UR: 2.02 DG
CHLORIDE 24H UR-SRATE: 186 MMOL/24 H (ref 34–286)
CITRATE 24H UR-MRATE: 305 MG/24 H (ref 164–1191)
COLLECT DURATION TIME UR: 24 H
CREAT 24H UR-MRATE: 1981 MG/24 H (ref 930–2955)
HYDROXYAPATITE 24H ENGDIFF UR: 3.46 DG
MAGNESIUM 24H UR-MRATE: 117 MG/24 H (ref 51–269)
OSMOLALITY 24H UR: 838 MOSM/KG (ref 150–1150)
OXALATE 24H UR-MRATE: 13.2 MG/24 H (ref 9.7–40.5)
OXALATE 24H UR-SRATE: 0.15 MMOL/24 H (ref 0.11–0.46)
PH 24H UR: 5.5 [PH] (ref 4.5–8)
PHOSPHATE 24H UR-MRATE: 967 MG/24 H (ref 226–1797)
POTASSIUM 24H UR-SRATE: 35 MMOL/24 H (ref 16–105)
PROTEIN CATABOLIC RATE 24H UR-MRATE: 106 G/24 H (ref 56–125)
SODIUM 24H UR-SRATE: 192 MMOL/24 H (ref 22–328)
SPECIMEN VOL 24H UR: 1165 ML
SULFATE 24H UR-SRATE: 26 MMOL/24 H (ref 7–47)
URATE 24H UR-MRATE: 699 MG/24 H (ref 200–1000)
URIC ACID CRYSTAL(MAYO): 4.32 DG
URINALYSIS SPECIALIST REVIEW: ABNORMAL
UUN 24H UR-MRATE: 13 G/24 H (ref 7–42)

## 2024-06-04 DIAGNOSIS — N20.0 KIDNEY STONES, CALCIUM OXALATE: Primary | ICD-10-CM

## 2024-06-10 ENCOUNTER — TELEPHONE (OUTPATIENT)
Dept: PEDIATRICS | Facility: CLINIC | Age: 24
End: 2024-06-10
Payer: COMMERCIAL

## 2024-06-10 DIAGNOSIS — K52.9 CHRONIC DIARRHEA: Primary | ICD-10-CM

## 2024-06-10 RX ORDER — POTASSIUM CITRATE 10 MEQ/1
10 TABLET, EXTENDED RELEASE ORAL
Qty: 180 TABLET | Refills: 1 | Status: SHIPPED | OUTPATIENT
Start: 2024-06-10 | End: 2024-12-07

## 2024-06-10 NOTE — TELEPHONE ENCOUNTER
I called mom due to a message I received from Earnestine Porter the nephrologist.  They actually did connect today and a medication has been called in.  I will see him next week to start the diarrhea work up and will refer him to GI so he can move along on that evaluation.

## 2024-06-17 ENCOUNTER — APPOINTMENT (OUTPATIENT)
Dept: PEDIATRICS | Facility: CLINIC | Age: 24
End: 2024-06-17
Payer: COMMERCIAL

## 2024-06-17 ENCOUNTER — LAB (OUTPATIENT)
Dept: LAB | Facility: LAB | Age: 24
End: 2024-06-17
Payer: COMMERCIAL

## 2024-06-17 VITALS — TEMPERATURE: 97.8 F | BODY MASS INDEX: 28.47 KG/M2 | WEIGHT: 198.4 LBS

## 2024-06-17 DIAGNOSIS — K52.9 FREQUENT STOOLS: Primary | ICD-10-CM

## 2024-06-17 DIAGNOSIS — R21 RASH: ICD-10-CM

## 2024-06-17 DIAGNOSIS — K52.9 FREQUENT STOOLS: ICD-10-CM

## 2024-06-17 DIAGNOSIS — B07.9 VERRUCA VULGARIS: ICD-10-CM

## 2024-06-17 PROCEDURE — 17110 DESTRUCTION B9 LES UP TO 14: CPT | Performed by: PEDIATRICS

## 2024-06-17 PROCEDURE — 82784 ASSAY IGA/IGD/IGG/IGM EACH: CPT

## 2024-06-17 PROCEDURE — 36415 COLL VENOUS BLD VENIPUNCTURE: CPT

## 2024-06-17 PROCEDURE — 99214 OFFICE O/P EST MOD 30 MIN: CPT | Performed by: PEDIATRICS

## 2024-06-17 PROCEDURE — 83516 IMMUNOASSAY NONANTIBODY: CPT

## 2024-06-17 ASSESSMENT — ENCOUNTER SYMPTOMS
ABDOMINAL PAIN: 1
BLOATING: 0
FLATUS: 0
DIARRHEA: 1
VOMITING: 0
ARTHRALGIAS: 0
WEIGHT LOSS: 0
FEVER: 0

## 2024-06-17 NOTE — PROGRESS NOTES
Subjective   Hima Mesa is a 23 y.o. male who presents for Diarrhea (Has appt with gastro in July/Has seen nephrologist and urologist as well for kidney stones Last visit in April/Here by himself).  Today he is accompanied by caregiver who is also providing history.    No FH bowel disease.  Also has warts he would like to be treated.  Also has a rash on either side of his scrotum which is a little itchy and comes and goes.     Diarrhea   This is a chronic problem. Episode onset: for as long as he can remember. Episode frequency: he usually has 5+ stools per day. The problem has been unchanged. Diarrhea characteristics: the stools are formed but not dense.  They usually float.  No mucous or blood.  May have actual diarrhea once a week. Diarrhea awakens from sleep: once every week or two will have BM at night. Associated symptoms include abdominal pain (20% of the time he gets some abdominal sxs prior to BM). Pertinent negatives include no arthralgias, bloating, fever, increased  flatus, vomiting or weight loss (has actually gained 40+ pounds over the past year). Nothing (not associated with eating) aggravates the symptoms. Risk factors include recent antibiotic use. He has tried nothing for the symptoms. There is no history of inflammatory bowel disease, irritable bowel syndrome or malabsorption.       Objective     Temp 36.6 °C (97.8 °F) (Oral)   Wt 90 kg (198 lb 6.4 oz)   BMI 28.47 kg/m²     Physical Exam  Vitals reviewed.   Constitutional:       Appearance: Normal appearance.   HENT:      Right Ear: Tympanic membrane normal.      Left Ear: Tympanic membrane normal.      Nose: Nose normal.      Mouth/Throat:      Mouth: Mucous membranes are moist.   Eyes:      Conjunctiva/sclera: Conjunctivae normal.   Cardiovascular:      Rate and Rhythm: Normal rate and regular rhythm.      Heart sounds: Normal heart sounds.   Pulmonary:      Effort: Pulmonary effort is normal.      Breath sounds: Normal breath sounds.    Abdominal:      General: Abdomen is flat.      Palpations: Abdomen is soft. There is no mass.   Genitourinary:         Comments: Pink skin without change in texture  Musculoskeletal:      Cervical back: Normal range of motion.   Skin:     General: Skin is warm.      Findings: No rash.      Comments: Wart on left thumb and two on each foot   Neurological:      Mental Status: He is alert and oriented to person, place, and time.   Psychiatric:         Mood and Affect: Mood normal.         Assessment/Plan   Hima was seen today for diarrhea.  Diagnoses and all orders for this visit:  Frequent stools (Primary)  -     IgA; Future  -     Tissue Transglutaminase IgA; Future  Verruca vulgaris  Rash  Refer to GI.  Reviewed lab work and really the only dx left to rule out with blood work is celiac.  GI can decide on calprotectin or pcr - it just doesn't seem likely enough based on history and lab work. They will keep in mind absorption issues that may be leading to his kidney stones.    The rash is most consistent with a moisture rash - keep clean and dry and protected until healed.   Warts.  Discussed viral etiology of warts.  Discussed various treatment options.  Discussed care and concerns post treatment.  Follow up with concerns or if wart persists.

## 2024-06-17 NOTE — PROGRESS NOTES
Patient ID: Hima Mesa is a 23 y.o. male.    Destruction of lesion    Date/Time: 6/17/2024 12:55 PM    Performed by: Judie Ford MD  Authorized by: Judie Ford MD    Number of Lesions: 5  Lesion 1:     Skin lesion 1 location: left thumb.    Initial size (mm): 7    Final defect size (mm): 7    Malignancy: benign lesion      Destruction method: cryotherapy    Lesion 2:     Skin lesion 2 location: 2 on each foot.    Initial size (mm): 5 (one was 7)    Final defect size (mm): 5    Malignancy: benign lesion      Destruction method: cryotherapy      Comments:  On thumb and left foot the seal broke and some of the liquid escaped.  Otherwise, no issues.

## 2024-06-18 LAB
IGA SERPL-MCNC: 265 MG/DL (ref 70–400)
TTG IGA SER IA-ACNC: <1 U/ML

## 2024-07-17 PROBLEM — B95.8 STAPHYLOCOCCAL INFECTION OF SKIN: Status: ACTIVE | Noted: 2024-07-17

## 2024-07-17 PROBLEM — R11.10 VOMITING: Status: ACTIVE | Noted: 2023-04-28

## 2024-07-17 PROBLEM — L08.9 STAPHYLOCOCCAL INFECTION OF SKIN: Status: ACTIVE | Noted: 2024-07-17

## 2024-07-18 ENCOUNTER — APPOINTMENT (OUTPATIENT)
Dept: GASTROENTEROLOGY | Facility: CLINIC | Age: 24
End: 2024-07-18
Payer: COMMERCIAL

## 2024-07-18 VITALS — HEIGHT: 70 IN | OXYGEN SATURATION: 98 % | BODY MASS INDEX: 28.4 KG/M2 | WEIGHT: 198.4 LBS | HEART RATE: 70 BPM

## 2024-07-18 DIAGNOSIS — R13.19 OTHER DYSPHAGIA: ICD-10-CM

## 2024-07-18 DIAGNOSIS — K52.9 CHRONIC DIARRHEA: Primary | ICD-10-CM

## 2024-07-18 PROCEDURE — 3008F BODY MASS INDEX DOCD: CPT

## 2024-07-18 PROCEDURE — 99204 OFFICE O/P NEW MOD 45 MIN: CPT

## 2024-07-18 RX ORDER — SODIUM, POTASSIUM,MAG SULFATES 17.5-3.13G
1 SOLUTION, RECONSTITUTED, ORAL ORAL ONCE
Qty: 1 EACH | Refills: 0 | Status: SHIPPED | OUTPATIENT
Start: 2024-07-18 | End: 2024-07-18

## 2024-07-18 ASSESSMENT — ENCOUNTER SYMPTOMS
COUGH: 0
VOMITING: 0
CONSTIPATION: 0
ABDOMINAL PAIN: 1
DIARRHEA: 1
FEVER: 0
ANAL BLEEDING: 0
APPETITE CHANGE: 0
BLOOD IN STOOL: 0
NAUSEA: 0
TROUBLE SWALLOWING: 0
CHILLS: 0
SHORTNESS OF BREATH: 0
ABDOMINAL DISTENTION: 0
RECTAL PAIN: 0
FATIGUE: 0

## 2024-07-18 NOTE — PATIENT INSTRUCTIONS
Please schedule your upper endoscopy and colonoscopy. You will need a ride since this involves sedation.  I will send a bowel prep to your pharmacy.  Clear liquid diet the day before the procedure. Start the 1st part of the prep at 6 pm the night prior and the other half 5 hrs before the procedure.    Please follow up 3 weeks post procedure

## 2024-07-18 NOTE — PROGRESS NOTES
Subjective     History of Present Illness:   Hima Mesa is a 23 y.o. male with PMHx of anorexia, EtOH dependence, cannabis use disorder, amphetamine use disorder who presents to GI clinic for further evaluation of chronic diarrhea    Today, patient here for frequent stools 7-8 times daily with loose stools his entire life.  Sometimes has cramping pain.  Sometimes chokes on food and it sticks.  Brother has psoriasis autoimmune condition.  Did not realize his symptoms were abnormal until he discussed them with his urologist.  Family history of celiac disease, so is inquiring about that although had some lab work that was - 6/2024.  Also at this time CRP and ESR were normal.  Denies constipation, dyspepsia, melena, hematochezia, unintentional weight loss  1/2024 CT abdomen pelvis showed no abdominal abnormalities      Social ETOH, denies smoking or marijuana  Denies fxh GI cancer or IBD  Abdominal Surgeries: denies    Denies colonoscopy   Denies EGD       Past Medical History  As per HPI.     Social History  he  reports that he has been smoking cigarettes. He has a 0.3 pack-year smoking history. He has never been exposed to tobacco smoke. He has never used smokeless tobacco. He reports that he does not currently use alcohol. He reports that he does not currently use drugs after having used the following drugs: Amphetamines, Cocaine, Hydrocodone, Marijuana, and Oxycodone.     Family History  his family history includes Anxiety disorder in his brother; Hypertension in his father; Macular degeneration in his maternal great-grandmother; No Known Problems in his brother and mother.     Review of Systems  Review of Systems   Constitutional:  Negative for appetite change, chills, fatigue and fever.   HENT:  Negative for trouble swallowing.    Respiratory:  Negative for cough and shortness of breath.    Gastrointestinal:  Positive for abdominal pain (Generalized intermittent cramping) and diarrhea. Negative for abdominal  distention, anal bleeding, blood in stool, constipation, nausea, rectal pain and vomiting.       Allergies  Allergies   Allergen Reactions    Cat Dander Itching and Runny nose       Medications  Current Outpatient Medications   Medication Instructions    potassium citrate CR (Urocit-K-10) 10 mEq ER tablet 10 mEq, oral, 2 times daily (morning and late afternoon), Do not crush, chew, or split.    sodium,potassium,mag sulfates (Suprep Bowel Prep Kit) 17.5-3.13-1.6 gram recon soln solution 1 bottle, oral, Once, Take as directed.    traZODone (DESYREL) 100 mg, oral, Nightly        Objective   Visit Vitals  Pulse 70      Physical Exam  Constitutional:       Appearance: Normal appearance. He is normal weight.   HENT:      Mouth/Throat:      Mouth: Mucous membranes are dry.      Pharynx: Oropharynx is clear.   Cardiovascular:      Rate and Rhythm: Normal rate and regular rhythm.   Pulmonary:      Effort: Pulmonary effort is normal.      Breath sounds: Normal breath sounds. No wheezing or rhonchi.   Abdominal:      General: Abdomen is flat. Bowel sounds are normal. There is distension.      Palpations: Abdomen is soft. There is no hepatomegaly.      Tenderness: There is abdominal tenderness (Mid abdominal). There is no guarding or rebound. Negative signs include Pennington's sign.      Hernia: No hernia is present.   Musculoskeletal:         General: Normal range of motion.   Skin:     General: Skin is warm and dry.   Neurological:      General: No focal deficit present.      Mental Status: He is alert and oriented to person, place, and time.   Psychiatric:         Mood and Affect: Mood normal.         Behavior: Behavior normal.           Lab Results   Component Value Date    WBC 6.6 05/28/2024    WBC 15.0 (H) 01/29/2024    WBC 8.0 05/30/2023    HGB 15.6 05/28/2024    HGB 14.8 01/29/2024    HGB 13.7 05/30/2023    HCT 43.9 05/28/2024    HCT 40.3 (L) 01/29/2024    HCT 38.9 (L) 05/30/2023     05/28/2024     01/29/2024      05/30/2023     Lab Results   Component Value Date     05/28/2024     01/29/2024     05/30/2023    K 4.1 05/28/2024    K 3.4 (L) 01/29/2024    K 3.1 (L) 05/30/2023     05/28/2024     01/29/2024    CL 98 05/30/2023    CO2 22 05/28/2024    CO2 23 01/29/2024    CO2 20 (L) 05/30/2023    BUN 20 05/28/2024    BUN 22 01/29/2024    BUN 18 05/30/2023    CREATININE 0.73 05/28/2024    CREATININE 1.27 01/29/2024    CREATININE 0.7 05/30/2023    CALCIUM 8.9 05/28/2024    CALCIUM 9.4 01/29/2024    CALCIUM 9.1 05/30/2023    PROT 7.0 05/28/2024    PROT 6.9 04/10/2023    BILITOT 0.5 05/28/2024    BILITOT 0.4 04/10/2023    ALKPHOS 64 05/28/2024    ALKPHOS 45 04/10/2023    ALT 28 05/28/2024    ALT 18 04/10/2023    AST 17 05/28/2024    AST 20 04/10/2023    GLUCOSE 94 05/28/2024    GLUCOSE 104 (H) 01/29/2024    GLUCOSE 139 (H) 05/30/2023           Hima Mesa is a 23 y.o. male who presents to GI clinic for chronic diarrhea.    Gastrointestinal and Abdominal  Rule out IBD, celiac disease, consider IBS-D, less likely pancreatic insufficiency.  Consider gastritis, esophagitis, duodenitis, PUD  -Schedule EGD and colonoscopy  -Follow-up 3 weeks postprocedure         Debi Milligan, APRN-CNP

## 2024-07-18 NOTE — ASSESSMENT & PLAN NOTE
Rule out IBD, celiac disease, consider IBS-D, less likely pancreatic insufficiency.  Consider gastritis, esophagitis, duodenitis, PUD  -Schedule EGD and colonoscopy  -Follow-up 3 weeks postprocedure

## 2024-07-20 ENCOUNTER — APPOINTMENT (OUTPATIENT)
Dept: PEDIATRICS | Facility: CLINIC | Age: 24
End: 2024-07-20
Payer: COMMERCIAL

## 2024-08-05 ENCOUNTER — OFFICE VISIT (OUTPATIENT)
Dept: UROLOGY | Facility: HOSPITAL | Age: 24
End: 2024-08-05
Payer: COMMERCIAL

## 2024-08-05 DIAGNOSIS — N20.0 KIDNEY STONES, CALCIUM OXALATE: Primary | ICD-10-CM

## 2024-08-05 PROCEDURE — 99214 OFFICE O/P EST MOD 30 MIN: CPT | Performed by: UROLOGY

## 2024-08-05 NOTE — PROGRESS NOTES
.vangieuvNAME:Hima Mesa  DATE: 8/5/2024               Subjective:   Chief complaint: Kidney stones    HPI:  23 y.o. male presenting for evaluation of kidney stones     Has seen Dr. Ahmadi in the past for stones.  Has ESWL on 2/22/24    KUB 4/4/24 - cluster of stone in distal right ureter.      Stones calcium oxalate. Has passed multiple stones over the past few years.      Currently asymptomatic    Has seen nephrology.  Started on K-citrate.  Tolerating ok.      Past Medical History:   Diagnosis Date    ADHD     Anxiety     Classic migraine with aura 04/10/2023    H/O renal calculi     Infectious mononucleosis 02/25/2019    Mononucleosis 02/25/2019    Staph skin infection 04/10/2023    Unspecified papilledema 06/18/2015    Optic nerve edema     Past Surgical History:   Procedure Laterality Date    EXTRACORPOREAL SHOCK WAVE LITHOTRIPSY Left 05/04/2023    EXTRACORPOREAL SHOCK WAVE LITHOTRIPSY Right 04/13/2023     Social History     Tobacco Use    Smoking status: Some Days     Current packs/day: 0.50     Average packs/day: 0.5 packs/day for 0.5 years (0.3 ttl pk-yrs)     Types: Cigarettes     Passive exposure: Never    Smokeless tobacco: Never    Tobacco comments:     Pt doesn't have desire to quit smoking at this time    Substance Use Topics    Alcohol use: Not Currently     Family History   Problem Relation Name Age of Onset    No Known Problems Mother Faby     Hypertension Father Jason     Anxiety disorder Brother Daren     No Known Problems Brother Moshe     Macular degeneration Maternal Great-Grandmother       Current Outpatient Medications on File Prior to Visit   Medication Sig Dispense Refill    potassium citrate CR (Urocit-K-10) 10 mEq ER tablet Take 1 tablet (10 mEq) by mouth 2 times daily (morning and late afternoon). Do not crush, chew, or split. 180 tablet 1    traZODone (Desyrel) 100 mg tablet TAKE 1 TABLET BY MOUTH EVERYDAY AT BEDTIME 30 tablet 0     No current facility-administered medications on  file prior to visit.     Hima is allergic to cat dander.     Review of Systems    14 point ROS reviewed and discussed with the patient. Pertinent positives/negatives discussed in the History of Present Illness (HPI).    Objective:     Physical Exam   1. Constitutional: NAD, Well-developed, Well-nourished  2. Respiratory: Unlabored breathing, no audible wheezes, no use of accessory muscles   3. Cardiovascular: No JVD, extremities perfused, no edema  4. Abdomen: Soft, non-tender, non-distended, no masses or hernia.  No CVA tenderness.    5. Skin: no visible lesions, plaque, rashes, jaundice  6. Neuro: Gait normal, no focal neurologic deficit  7. Psychiatric: Mood and affect normal and appropriate, alert and oriented    Labs  Lab Results   Component Value Date    GFRMALE >90 04/10/2023     Lab Results   Component Value Date    CREATININE 0.73 05/28/2024     Lab Results   Component Value Date    HCT 43.9 05/28/2024       Assessment/Plan:   Hima Mesa presents with     1. Kidney stones, calcium oxalate      Recurrent stone former  Currently asymptomatic  Stone prevention discussed  Fu with nephrology for medical management of stone disease  Cont K-citrate    Will get KUB to assess stone burden

## 2024-08-08 ENCOUNTER — HOSPITAL ENCOUNTER (OUTPATIENT)
Dept: RADIOLOGY | Facility: HOSPITAL | Age: 24
Discharge: HOME | End: 2024-08-08
Payer: COMMERCIAL

## 2024-08-08 DIAGNOSIS — N20.0 KIDNEY STONES, CALCIUM OXALATE: ICD-10-CM

## 2024-08-08 PROCEDURE — 74018 RADEX ABDOMEN 1 VIEW: CPT

## 2024-08-21 ENCOUNTER — APPOINTMENT (OUTPATIENT)
Dept: GASTROENTEROLOGY | Facility: CLINIC | Age: 24
End: 2024-08-21
Payer: COMMERCIAL

## 2024-09-20 ENCOUNTER — APPOINTMENT (OUTPATIENT)
Dept: GASTROENTEROLOGY | Facility: EXTERNAL LOCATION | Age: 24
End: 2024-09-20
Payer: COMMERCIAL

## 2024-09-20 DIAGNOSIS — K52.9 CHRONIC DIARRHEA: ICD-10-CM

## 2024-09-20 DIAGNOSIS — K52.9 CHRONIC DIARRHEA: Primary | ICD-10-CM

## 2024-09-20 DIAGNOSIS — R59.9 LYMPHOID HYPERPLASIA: ICD-10-CM

## 2024-09-20 DIAGNOSIS — R13.19 OTHER DYSPHAGIA: ICD-10-CM

## 2024-09-20 PROCEDURE — 88305 TISSUE EXAM BY PATHOLOGIST: CPT | Performed by: PATHOLOGY

## 2024-09-20 PROCEDURE — 45380 COLONOSCOPY AND BIOPSY: CPT | Performed by: INTERNAL MEDICINE

## 2024-09-20 PROCEDURE — 43239 EGD BIOPSY SINGLE/MULTIPLE: CPT | Performed by: INTERNAL MEDICINE

## 2024-09-20 PROCEDURE — 88305 TISSUE EXAM BY PATHOLOGIST: CPT

## 2024-09-21 ENCOUNTER — LAB REQUISITION (OUTPATIENT)
Dept: LAB | Facility: HOSPITAL | Age: 24
End: 2024-09-21
Payer: COMMERCIAL

## 2024-09-30 LAB
LAB AP ADENOMA INDICATOR: NO
LABORATORY COMMENT REPORT: NORMAL
PATH REPORT.FINAL DX SPEC: NORMAL
PATH REPORT.GROSS SPEC: NORMAL
PATH REPORT.RELEVANT HX SPEC: NORMAL
PATH REPORT.TOTAL CANCER: NORMAL

## 2024-09-30 NOTE — RESULT ENCOUNTER NOTE
Biopsies (esophagus, duodenum, ileum, colon ) all normal.    Recommend follow up as needed with Debi ALLISON.

## 2024-10-17 ENCOUNTER — APPOINTMENT (OUTPATIENT)
Dept: GASTROENTEROLOGY | Facility: CLINIC | Age: 24
End: 2024-10-17
Payer: COMMERCIAL

## 2024-10-17 VITALS — HEIGHT: 70 IN | BODY MASS INDEX: 27.06 KG/M2 | WEIGHT: 189 LBS | HEART RATE: 70 BPM

## 2024-10-17 DIAGNOSIS — K58.0 IRRITABLE BOWEL SYNDROME WITH DIARRHEA: Primary | ICD-10-CM

## 2024-10-17 DIAGNOSIS — K21.9 GASTROESOPHAGEAL REFLUX DISEASE, UNSPECIFIED WHETHER ESOPHAGITIS PRESENT: ICD-10-CM

## 2024-10-17 PROCEDURE — 3008F BODY MASS INDEX DOCD: CPT

## 2024-10-17 PROCEDURE — 99213 OFFICE O/P EST LOW 20 MIN: CPT

## 2024-10-17 RX ORDER — DESIPRAMINE HYDROCHLORIDE 10 MG/1
10 TABLET ORAL NIGHTLY
Qty: 30 TABLET | Refills: 11 | Status: SHIPPED | OUTPATIENT
Start: 2024-10-17 | End: 2025-10-17

## 2024-10-17 RX ORDER — FAMOTIDINE 40 MG/1
40 TABLET, FILM COATED ORAL NIGHTLY PRN
Qty: 30 TABLET | Refills: 11 | Status: SHIPPED | OUTPATIENT
Start: 2024-10-17 | End: 2025-10-17

## 2024-10-17 ASSESSMENT — ENCOUNTER SYMPTOMS
NAUSEA: 0
ABDOMINAL PAIN: 1
BLOOD IN STOOL: 0
SHORTNESS OF BREATH: 0
FATIGUE: 0
ABDOMINAL DISTENTION: 0
CONSTIPATION: 0
FEVER: 0
DIARRHEA: 1
ANAL BLEEDING: 0
APPETITE CHANGE: 0
RECTAL PAIN: 0
TROUBLE SWALLOWING: 0
CHILLS: 0
VOMITING: 0
COUGH: 0

## 2024-10-17 NOTE — ASSESSMENT & PLAN NOTE
Symptoms consistent with IBS-D  -Start daily fiber supplement  -Start 10 mg desipramine at bedtime  -Consider Xifaxan    Follow-up annually or as needed

## 2024-10-17 NOTE — PATIENT INSTRUCTIONS
Please take 1-2 spoonfuls daily of fiber.  Dissolve in 8 oz liquid or you may use fiber gummies.  This is to help maintain stool consistency.  Please start desipramine daily for your IBS-D  Please take Pepcid 30-60 minutes before a meal daily or as needed.  This is to help calm stomach acid.    Follow up annually or as needed

## 2024-10-17 NOTE — PROGRESS NOTES
Subjective     History of Present Illness:   Hima Mesa is a 23 y.o. male with PMHx of anorexia, EtOH dependence, cannabis use disorder, amphetamine use disorder who presents to GI clinic for follow-up  Last seen 7/2024 for chronic diarrhea and GERD.,  EGD and colonoscopy ordered    Today, patient's symptoms unchanged from last visit still frequent stools which is caused his potassium to drop in the past.  Food occasionally sticking sometimes.  EGD showed reflux esophagitis.  Patient states he does have high level of stress and anxiety.  Denies constipation, melena, hematochezia, unintentional weight loss  1/2024 CT abdomen pelvis showed no abdominal abnormalities      Social ETOH, denies smoking or marijuana  Denies fxh GI cancer or IBD  Abdominal Surgeries: denies    Colonoscopy 9/2024 Dr. Rosales: Nodular mucosa TI.  Pathology negative microscopic colitis  EGD 9/2024 Dr. Rosales: Grade a reflux esophagitis.  Pathology: Negative celiac      Past Medical History  As per HPI.     Social History  he  reports that he has been smoking cigarettes. He has a 0.3 pack-year smoking history. He has never been exposed to tobacco smoke. He has never used smokeless tobacco. He reports that he does not currently use alcohol. He reports that he does not currently use drugs after having used the following drugs: Amphetamines, Cocaine, Hydrocodone, Marijuana, and Oxycodone.     Family History  his family history includes Anxiety disorder in his brother; Hypertension in his father; Macular degeneration in his maternal great-grandmother; No Known Problems in his brother and mother.     Review of Systems  Review of Systems   Constitutional:  Negative for appetite change, chills, fatigue and fever.   HENT:  Negative for trouble swallowing.    Respiratory:  Negative for cough and shortness of breath.    Gastrointestinal:  Positive for abdominal pain (Generalized intermittent cramping) and diarrhea. Negative for abdominal  distention, anal bleeding, blood in stool, constipation, nausea, rectal pain and vomiting.       Allergies  Allergies   Allergen Reactions    Cat Dander Itching and Runny nose       Medications  Current Outpatient Medications   Medication Instructions    desipramine (NORPRAMIN) 10 mg, oral, Nightly    famotidine (PEPCID) 40 mg, oral, Nightly PRN    potassium citrate CR (Urocit-K-10) 10 mEq ER tablet 10 mEq, oral, 2 times daily (morning and late afternoon), Do not crush, chew, or split.    traZODone (DESYREL) 100 mg, oral, Nightly        Objective   Visit Vitals  Pulse 70      Physical Exam      Lab Results   Component Value Date    WBC 6.6 05/28/2024    WBC 15.0 (H) 01/29/2024    WBC 8.0 05/30/2023    HGB 15.6 05/28/2024    HGB 14.8 01/29/2024    HGB 13.7 05/30/2023    HCT 43.9 05/28/2024    HCT 40.3 (L) 01/29/2024    HCT 38.9 (L) 05/30/2023     05/28/2024     01/29/2024     05/30/2023     Lab Results   Component Value Date     05/28/2024     01/29/2024     05/30/2023    K 4.1 05/28/2024    K 3.4 (L) 01/29/2024    K 3.1 (L) 05/30/2023     05/28/2024     01/29/2024    CL 98 05/30/2023    CO2 22 05/28/2024    CO2 23 01/29/2024    CO2 20 (L) 05/30/2023    BUN 20 05/28/2024    BUN 22 01/29/2024    BUN 18 05/30/2023    CREATININE 0.73 05/28/2024    CREATININE 1.27 01/29/2024    CREATININE 0.7 05/30/2023    CALCIUM 8.9 05/28/2024    CALCIUM 9.4 01/29/2024    CALCIUM 9.1 05/30/2023    PROT 7.0 05/28/2024    PROT 6.9 04/10/2023    BILITOT 0.5 05/28/2024    BILITOT 0.4 04/10/2023    ALKPHOS 64 05/28/2024    ALKPHOS 45 04/10/2023    ALT 28 05/28/2024    ALT 18 04/10/2023    AST 17 05/28/2024    AST 20 04/10/2023    GLUCOSE 94 05/28/2024    GLUCOSE 104 (H) 01/29/2024    GLUCOSE 139 (H) 05/30/2023           Hima Mesa is a 23 y.o. male who presents to GI clinic for chronic diarrhea and GERD.    Gastroesophageal reflux disease  Reflux esophagitis  -Start 40 mg Pepcid daily  or as needed    Irritable bowel syndrome with diarrhea  Symptoms consistent with IBS-D  -Start daily fiber supplement  -Start 10 mg desipramine at bedtime  -Consider Xifaxan    Follow-up annually or as needed           Debi Milligan, APRN-CNP

## 2024-11-08 DIAGNOSIS — K58.0 IRRITABLE BOWEL SYNDROME WITH DIARRHEA: ICD-10-CM

## 2024-11-08 RX ORDER — DESIPRAMINE HYDROCHLORIDE 10 MG/1
10 TABLET ORAL NIGHTLY
Qty: 90 TABLET | Refills: 2 | Status: SHIPPED | OUTPATIENT
Start: 2024-11-08 | End: 2025-11-08

## 2024-11-14 ENCOUNTER — APPOINTMENT (OUTPATIENT)
Dept: NEPHROLOGY | Facility: CLINIC | Age: 24
End: 2024-11-14
Payer: COMMERCIAL

## 2024-11-14 VITALS
HEIGHT: 70 IN | HEART RATE: 67 BPM | DIASTOLIC BLOOD PRESSURE: 77 MMHG | SYSTOLIC BLOOD PRESSURE: 123 MMHG | WEIGHT: 192 LBS | BODY MASS INDEX: 27.49 KG/M2

## 2024-11-14 DIAGNOSIS — N20.0 KIDNEY STONES, CALCIUM OXALATE: Primary | ICD-10-CM

## 2024-11-14 PROCEDURE — 99214 OFFICE O/P EST MOD 30 MIN: CPT | Performed by: INTERNAL MEDICINE

## 2024-11-14 PROCEDURE — 3008F BODY MASS INDEX DOCD: CPT | Performed by: INTERNAL MEDICINE

## 2024-11-14 NOTE — PROGRESS NOTES
Patient ID: Hima Mesa is a 23 y.o. male who is seen in nephrology clinic for calcium oxalate nephrolithiasis.  Pt reports first developed stones last April.  Since that time has had lithotripsy x 2 and spontaneously passed stones on three other occasions.      Review of blood work and urine studies shows hypokalemia and intermittent non gap metabolic acidosis.  Urine pH at times on the high side (6 and 8) but also ability acidify down to pH 5 as of 1/2023.  There is no hypercalcemia.  Most recent blood work in may was normal.     Pt rpreviously reported 6 bowel movements per day.  Underwent evaluation by GI colleagues.  No evidence of an inflammatory bowel disease.  Diagnosed with irritable amy.  Symptoms better with a PPI.     24 hour stone risk profile with low urine volume (less than 1.5L) hyperalciuiria, ~ 200 meq sodium.  Started on k citrate but pt stopped taking after a few weeks.  Denies passage of any new stones.     No joint pains, abdominal pain, fevers, weight loss.  No vison changes or rashes.     Patient Active Problem List   Diagnosis    ADHD (attention deficit hyperactivity disorder), combined type    Alcohol use disorder, moderate, dependence (Multi)    Amphetamine use disorder, moderate (Multi)    Anisocoria    Anxiety    Cannabis use disorder, severe, dependence (Multi)    Disorder of optic nerve    Myopia of right eye    Optic nerve drusen    Recurrent infection of skin    Kidney stones, calcium oxalate    Calculus in urethra    Right ureteral stone    Staphylococcal infection of skin    Swelling of eye    Vomiting    Other dysphagia    Chronic diarrhea    Irritable bowel syndrome with diarrhea    Gastroesophageal reflux disease        Family History   Problem Relation Name Age of Onset    No Known Problems Mother Faby     Hypertension Father Jason     Anxiety disorder Brother Daren     No Known Problems Brother Moshe     Macular degeneration Maternal Great-Grandmother         Social  "History     Tobacco Use    Smoking status: Some Days     Current packs/day: 0.50     Average packs/day: 0.5 packs/day for 0.5 years (0.3 ttl pk-yrs)     Types: Cigarettes     Passive exposure: Never    Smokeless tobacco: Never    Tobacco comments:     Pt doesn't have desire to quit smoking at this time    Substance Use Topics    Alcohol use: Not Currently             Current Outpatient Medications:     famotidine (Pepcid) 40 mg tablet, Take 1 tablet (40 mg) by mouth as needed at bedtime for heartburn., Disp: 30 tablet, Rfl: 11    desipramine (Norpramin) 10 mg tablet, Take 1 tablet (10 mg) by mouth once daily at bedtime. (Patient not taking: Reported on 11/14/2024), Disp: 90 tablet, Rfl: 2    potassium citrate CR (Urocit-K-10) 10 mEq ER tablet, Take 1 tablet (10 mEq) by mouth 2 times daily (morning and late afternoon). Do not crush, chew, or split. (Patient not taking: Reported on 11/14/2024), Disp: 180 tablet, Rfl: 1    traZODone (Desyrel) 100 mg tablet, TAKE 1 TABLET BY MOUTH EVERYDAY AT BEDTIME (Patient not taking: Reported on 11/14/2024), Disp: 30 tablet, Rfl: 0       Vitals:    11/14/24 1101   BP: 123/77   Pulse: 67        Physical Exam  Gen alert, pleasant  Heart RRR, no murmurs or rubs  Lungs clear bilaterally  Abd soft, no bruits noted  Ext no lower extremity edema bilaterally, normal pulses  Skin no bruises or rashes on visible skin surfaces  Neuro no asterixis, no focal deficits  Psych normal affect      Lab Results   Component Value Date    WBC 6.6 05/28/2024    HGB 15.6 05/28/2024    HCT 43.9 05/28/2024    MCV 88 05/28/2024     05/28/2024      Lab Results   Component Value Date    GLUCOSE 94 05/28/2024    CALCIUM 8.9 05/28/2024     05/28/2024    K 4.1 05/28/2024    CO2 22 05/28/2024     05/28/2024    BUN 20 05/28/2024    CREATININE 0.73 05/28/2024      No results found for: \"MICROALBCREA\"     Lab Results   Component Value Date    PTH 31.4 05/28/2024                Assessment/Plan "   Nephrolithiasis  -  calcium oxalate stones.  No further stones passed since his last visit with me.  Was presribed k citrate but pt has not been taking.  Discussed 24 hour urine stone risk profile and findings of hypercalciuria, low urine volume, high amount of sodium.  Discussed dietary modifications, low oxalate diet and increasing fluid intake to 2-3L daily.   Ok to hold k citrate for now.     Will repeat 24 hour urine in a few weeks and obtain renal panel

## 2025-04-17 ENCOUNTER — APPOINTMENT (OUTPATIENT)
Dept: PEDIATRICS | Facility: CLINIC | Age: 25
End: 2025-04-17
Payer: COMMERCIAL

## 2025-04-17 VITALS — BODY MASS INDEX: 21.98 KG/M2 | HEIGHT: 70 IN | TEMPERATURE: 98.4 F | WEIGHT: 153.5 LBS

## 2025-04-17 DIAGNOSIS — S93.402A MODERATE LEFT ANKLE SPRAIN, INITIAL ENCOUNTER: Primary | ICD-10-CM

## 2025-04-17 DIAGNOSIS — F43.10 PTSD (POST-TRAUMATIC STRESS DISORDER): ICD-10-CM

## 2025-04-17 PROCEDURE — 3008F BODY MASS INDEX DOCD: CPT | Performed by: PEDIATRICS

## 2025-04-17 PROCEDURE — G2211 COMPLEX E/M VISIT ADD ON: HCPCS | Performed by: PEDIATRICS

## 2025-04-17 PROCEDURE — 99214 OFFICE O/P EST MOD 30 MIN: CPT | Performed by: PEDIATRICS

## 2025-04-17 ASSESSMENT — ENCOUNTER SYMPTOMS
LOSS OF MOTION: 1
INABILITY TO BEAR WEIGHT: 0
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
## 2025-04-24 ENCOUNTER — OFFICE VISIT (OUTPATIENT)
Dept: PEDIATRICS | Facility: CLINIC | Age: 25
End: 2025-04-24
Payer: COMMERCIAL

## 2025-04-24 VITALS
HEIGHT: 69 IN | SYSTOLIC BLOOD PRESSURE: 133 MMHG | HEART RATE: 71 BPM | WEIGHT: 150.5 LBS | DIASTOLIC BLOOD PRESSURE: 85 MMHG | BODY MASS INDEX: 22.29 KG/M2

## 2025-04-24 DIAGNOSIS — F43.10 PTSD (POST-TRAUMATIC STRESS DISORDER): Primary | ICD-10-CM

## 2025-04-24 DIAGNOSIS — G47.9 SLEEP DISORDER: ICD-10-CM

## 2025-04-24 PROCEDURE — 99214 OFFICE O/P EST MOD 30 MIN: CPT | Performed by: PEDIATRICS

## 2025-04-24 PROCEDURE — G2211 COMPLEX E/M VISIT ADD ON: HCPCS | Performed by: PEDIATRICS

## 2025-04-24 PROCEDURE — 3008F BODY MASS INDEX DOCD: CPT | Performed by: PEDIATRICS

## 2025-04-24 RX ORDER — SERTRALINE HYDROCHLORIDE 25 MG/1
TABLET, FILM COATED ORAL
Qty: 26 TABLET | Refills: 0 | Status: SHIPPED | OUTPATIENT
Start: 2025-04-24 | End: 2025-05-24
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
## 2025-05-15 ENCOUNTER — APPOINTMENT (OUTPATIENT)
Dept: NEPHROLOGY | Facility: CLINIC | Age: 25
End: 2025-05-15
Payer: COMMERCIAL

## 2025-05-15 VITALS
BODY MASS INDEX: 22.81 KG/M2 | HEIGHT: 69 IN | SYSTOLIC BLOOD PRESSURE: 129 MMHG | DIASTOLIC BLOOD PRESSURE: 75 MMHG | WEIGHT: 154 LBS | HEART RATE: 65 BPM

## 2025-05-15 DIAGNOSIS — N20.0 KIDNEY STONES, CALCIUM OXALATE: Primary | ICD-10-CM

## 2025-05-15 PROCEDURE — 99213 OFFICE O/P EST LOW 20 MIN: CPT | Performed by: INTERNAL MEDICINE

## 2025-05-15 PROCEDURE — 3008F BODY MASS INDEX DOCD: CPT | Performed by: INTERNAL MEDICINE

## 2025-05-15 ASSESSMENT — PATIENT HEALTH QUESTIONNAIRE - PHQ9
1. LITTLE INTEREST OR PLEASURE IN DOING THINGS: NOT AT ALL
SUM OF ALL RESPONSES TO PHQ9 QUESTIONS 1 AND 2: 0
2. FEELING DOWN, DEPRESSED OR HOPELESS: NOT AT ALL

## 2025-05-15 ASSESSMENT — PAIN SCALES - GENERAL: PAINLEVEL_OUTOF10: 0-NO PAIN

## 2025-05-15 NOTE — PROGRESS NOTES
Patient ID: Hima Mesa is a 24 y.o. male who is seen in nephrology clinic for calcium oxalate nephrolithiasis.  Pt reports first developed stones last April 2023.  Since that time has had lithotripsy x 2 and spontaneously passed stones on three other occasions.      Review of blood work and urine studies shows hypokalemia and intermittent non gap metabolic acidosis.  Urine pH at times on the high side (6 and 8) but also ability acidify down to pH 5 as of 1/2023 and again in 5/2024.  There is no hypercalcemia.  Most recent blood work in may 2024 in may was normal. Normal pth, magnesium levels    Denies any flank pain or stone passage in the past year     Pt rpreviously reported 6 bowel movements per day.  Underwent evaluation by GI colleagues.  No evidence of an inflammatory bowel disease.  Diagnosed with irritable bowel.  Symptoms better with a PPI.     24 hour stone risk profile last year with low urine volume (less than 1.5L) hyperalciuiria, ~ 200 meq sodium.  Started on k citrate but pt stopped taking after a few weeks.  Denies passage of any new stones.     No joint pains, abdominal pain, fevers, weight loss.  No vison changes or rashes.     Patient Active Problem List   Diagnosis    ADHD (attention deficit hyperactivity disorder), combined type    Alcohol use disorder, moderate, dependence (Multi)    Amphetamine use disorder, moderate (Multi)    Anisocoria    Anxiety    Cannabis use disorder, severe, dependence (Multi)    Disorder of optic nerve    Myopia of right eye    Optic nerve drusen    Recurrent infection of skin    Kidney stones, calcium oxalate    Calculus in urethra    Right ureteral stone    Staphylococcal infection of skin    Swelling of eye    Vomiting    Other dysphagia    Chronic diarrhea    Irritable bowel syndrome with diarrhea    Gastroesophageal reflux disease    PTSD (post-traumatic stress disorder)    Moderate left ankle sprain        Family History   Problem Relation Name Age of  Onset    No Known Problems Mother Faby Mesa     Hypertension Father Jason Mesa     Anxiety disorder Brother Daren     No Known Problems Brother Moshe     Macular degeneration Maternal Great-Grandmother         Social History     Tobacco Use    Smoking status: Some Days     Current packs/day: 0.50     Average packs/day: 0.5 packs/day for 0.5 years (0.3 ttl pk-yrs)     Types: Cigarettes     Passive exposure: Never    Smokeless tobacco: Never    Tobacco comments:     Pt doesn't have desire to quit smoking at this time    Substance Use Topics    Alcohol use: Not Currently             Current Outpatient Medications:     famotidine (Pepcid) 40 mg tablet, Take 1 tablet (40 mg) by mouth as needed at bedtime for heartburn., Disp: 30 tablet, Rfl: 11    sertraline (Zoloft) 25 mg tablet, Take 0.5 tablets (12.5 mg) by mouth once daily for 8 days, THEN 1 tablet (25 mg) once daily for 22 days., Disp: 26 tablet, Rfl: 0    desipramine (Norpramin) 10 mg tablet, Take 1 tablet (10 mg) by mouth once daily at bedtime. (Patient not taking: Reported on 5/15/2025), Disp: 90 tablet, Rfl: 2    traZODone (Desyrel) 100 mg tablet, TAKE 1 TABLET BY MOUTH EVERYDAY AT BEDTIME (Patient not taking: Reported on 5/15/2025), Disp: 30 tablet, Rfl: 0       Vitals:    05/15/25 1006   BP: 129/75   Pulse: 65          Physical Exam  Gen alert, pleasant  Heart RRR, no murmurs or rubs  Lungs clear bilaterally  Abd soft, no bruits noted  Ext no lower extremity edema bilaterally, normal pulses  Skin no bruises or rashes on visible skin surfaces  Neuro no asterixis, no focal deficits  Psych normal affect      Lab Results   Component Value Date    WBC 6.6 05/28/2024    HGB 15.6 05/28/2024    HCT 43.9 05/28/2024    MCV 88 05/28/2024     05/28/2024      Lab Results   Component Value Date    GLUCOSE 94 05/28/2024    CALCIUM 8.9 05/28/2024     05/28/2024    K 4.1 05/28/2024    CO2 22 05/28/2024     05/28/2024    BUN 20 05/28/2024     "CREATININE 0.73 05/28/2024      No results found for: \"MICROALBCREA\"     Lab Results   Component Value Date    PTH 31.4 05/28/2024                Assessment/Plan   Nephrolithiasis  -  calcium oxalate stones.  No further stones passed since his last visit with me.  Was presribed k citrate but pt has not been taking.  Did not complete follow up 24 hour urine.   Discussed previous 24 hour urine stone risk profile and findings of hypercalciuria, low urine volume, high amount of sodium.  Discussed impact dietary modifications, low oxalate diet and increasing fluid intake to 2-3L daily.       No other changes at this time    Follow up in one year                  "

## 2025-05-23 ENCOUNTER — TELEPHONE (OUTPATIENT)
Dept: PEDIATRICS | Facility: CLINIC | Age: 25
End: 2025-05-23
Payer: COMMERCIAL

## 2025-05-23 DIAGNOSIS — F43.10 PTSD (POST-TRAUMATIC STRESS DISORDER): ICD-10-CM

## 2025-05-23 RX ORDER — SERTRALINE HYDROCHLORIDE 25 MG/1
25 TABLET, FILM COATED ORAL DAILY
Qty: 8 TABLET | Refills: 0 | Status: SHIPPED | OUTPATIENT
Start: 2025-05-23 | End: 2025-05-31

## 2025-05-23 NOTE — TELEPHONE ENCOUNTER
Rx Refill Request Telephone Encounter    Name:  Hima Mesa  :  [id number]  Medication Name: sertraline (Zoloft) 25 mg tablet [[id number]]    Order Details      Dose : as directed   Route : as directedDose   Frequency : as directedDose :  Quantity :    Directions :    Specific Pharmacy location:  [facility]/pharmacy #[id number] [address] [address]  [facility]   [address] [facility] [facility] [address]   Date of last appointment:  25  Date of next appointment:  25  Best number to reach patient:    [phone]   Will be out of medication tomorrow  will not be in until next week mom is aware that you may not send medication since its still a new med

## 2025-05-28 ENCOUNTER — APPOINTMENT (OUTPATIENT)
Dept: PEDIATRICS | Facility: CLINIC | Age: 25
End: 2025-05-28
Payer: COMMERCIAL

## 2025-05-28 VITALS
BODY MASS INDEX: 21.47 KG/M2 | WEIGHT: 150 LBS | HEART RATE: 70 BPM | HEIGHT: 70 IN | DIASTOLIC BLOOD PRESSURE: 76 MMHG | SYSTOLIC BLOOD PRESSURE: 117 MMHG

## 2025-05-28 DIAGNOSIS — F43.10 PTSD (POST-TRAUMATIC STRESS DISORDER): Primary | ICD-10-CM

## 2025-05-28 PROCEDURE — 99214 OFFICE O/P EST MOD 30 MIN: CPT | Performed by: PEDIATRICS

## 2025-05-28 PROCEDURE — 3008F BODY MASS INDEX DOCD: CPT | Performed by: PEDIATRICS

## 2026-05-21 ENCOUNTER — APPOINTMENT (OUTPATIENT)
Dept: NEPHROLOGY | Facility: CLINIC | Age: 26
End: 2026-05-21
Payer: COMMERCIAL

## (undated) DEVICE — BLANKET, LOWER BODY, VHA PLUS, ADULT